# Patient Record
Sex: FEMALE | Race: WHITE | NOT HISPANIC OR LATINO | Employment: FULL TIME | URBAN - METROPOLITAN AREA
[De-identification: names, ages, dates, MRNs, and addresses within clinical notes are randomized per-mention and may not be internally consistent; named-entity substitution may affect disease eponyms.]

---

## 2017-12-09 ENCOUNTER — OFFICE VISIT (OUTPATIENT)
Dept: URGENT CARE | Facility: CLINIC | Age: 62
End: 2017-12-09
Payer: COMMERCIAL

## 2017-12-09 VITALS
HEIGHT: 62 IN | HEART RATE: 68 BPM | TEMPERATURE: 98 F | BODY MASS INDEX: 23.92 KG/M2 | SYSTOLIC BLOOD PRESSURE: 122 MMHG | OXYGEN SATURATION: 98 % | DIASTOLIC BLOOD PRESSURE: 74 MMHG | WEIGHT: 130 LBS | RESPIRATION RATE: 14 BRPM

## 2017-12-09 DIAGNOSIS — G43.009 MIGRAINE WITHOUT AURA AND WITHOUT STATUS MIGRAINOSUS, NOT INTRACTABLE: Primary | ICD-10-CM

## 2017-12-09 PROCEDURE — 99213 OFFICE O/P EST LOW 20 MIN: CPT | Mod: 25,S$GLB,, | Performed by: INTERNAL MEDICINE

## 2017-12-09 PROCEDURE — S0119 ONDANSETRON 4 MG: HCPCS | Mod: S$GLB,,, | Performed by: INTERNAL MEDICINE

## 2017-12-09 PROCEDURE — 96372 THER/PROPH/DIAG INJ SC/IM: CPT | Mod: S$GLB,,, | Performed by: INTERNAL MEDICINE

## 2017-12-09 RX ORDER — ONDANSETRON 8 MG/1
8 TABLET, ORALLY DISINTEGRATING ORAL
Status: COMPLETED | OUTPATIENT
Start: 2017-12-09 | End: 2017-12-09

## 2017-12-09 RX ORDER — KETOROLAC TROMETHAMINE 30 MG/ML
60 INJECTION, SOLUTION INTRAMUSCULAR; INTRAVENOUS
Status: COMPLETED | OUTPATIENT
Start: 2017-12-09 | End: 2017-12-09

## 2017-12-09 RX ORDER — ONDANSETRON 8 MG/1
8 TABLET, ORALLY DISINTEGRATING ORAL EVERY 6 HOURS PRN
Qty: 10 TABLET | Refills: 1 | Status: SHIPPED | OUTPATIENT
Start: 2017-12-09 | End: 2020-01-22 | Stop reason: ALTCHOICE

## 2017-12-09 RX ORDER — SUMATRIPTAN 50 MG/1
50 TABLET, FILM COATED ORAL ONCE AS NEEDED
Qty: 30 TABLET | Refills: 2 | Status: SHIPPED | OUTPATIENT
Start: 2017-12-09 | End: 2018-01-04

## 2017-12-09 RX ADMIN — ONDANSETRON 8 MG: 8 TABLET, ORALLY DISINTEGRATING ORAL at 11:12

## 2017-12-09 RX ADMIN — KETOROLAC TROMETHAMINE 60 MG: 30 INJECTION, SOLUTION INTRAMUSCULAR; INTRAVENOUS at 11:12

## 2017-12-09 NOTE — PROGRESS NOTES
"Subjective:       Patient ID: Kem Rousseau is a 61 y.o. female.    Vitals:  height is 5' 2" (1.575 m) and weight is 59 kg (130 lb). Her tympanic temperature is 97.8 °F (36.6 °C). Her blood pressure is 122/74 and her pulse is 68. Her respiration is 14 and oxygen saturation is 98%.     Chief Complaint: Headache    Headache    This is a recurrent problem. The current episode started yesterday. The problem occurs constantly. The problem has been gradually worsening. The pain is located in the left unilateral, frontal and temporal region. The pain radiates to the face. The pain quality is similar to prior headaches. The quality of the pain is described as throbbing. The pain is at a severity of 10/10. The pain is severe. Associated symptoms include ear pain, a loss of balance and nausea. Pertinent negatives include no blurred vision, dizziness, fever, neck pain, numbness, photophobia, seizures, tinnitus, vomiting or weakness. The symptoms are aggravated by bright light. She has tried acetaminophen and darkened room for the symptoms. The treatment provided no relief. Her past medical history is significant for migraine headaches.     Review of Systems   Constitution: Negative for chills, fever and weakness.   HENT: Positive for ear pain. Negative for congestion and tinnitus.    Eyes: Negative for blurred vision and photophobia.   Skin: Negative for rash.   Musculoskeletal: Negative for neck pain.   Gastrointestinal: Positive for nausea. Negative for vomiting.   Neurological: Positive for headaches and loss of balance. Negative for disturbances in coordination, dizziness, numbness and seizures.   Psychiatric/Behavioral: Negative for altered mental status. The patient is not nervous/anxious.        Objective:      Physical Exam   Constitutional: She is oriented to person, place, and time. She appears well-developed and well-nourished.   Eyes: Conjunctivae are normal. Pupils are equal, round, and reactive to light. "   Photophobia   Neck: Normal range of motion. Neck supple.   Cardiovascular: Normal rate, regular rhythm and normal heart sounds.    Pulmonary/Chest: Effort normal and breath sounds normal.   Abdominal: Soft.   Lymphadenopathy:     She has no cervical adenopathy.   Neurological: She is alert and oriented to person, place, and time. She displays normal reflexes. No cranial nerve deficit or sensory deficit. She exhibits normal muscle tone.   Skin: Skin is warm. No rash noted.   Psychiatric: She has a normal mood and affect.   Nursing note and vitals reviewed.      Assessment:       1. Migraine without aura and without status migrainosus, not intractable        Plan:         Migraine without aura and without status migrainosus, not intractable  -     ketorolac injection 60 mg; Inject 2 mLs (60 mg total) into the muscle one time.  -     ondansetron disintegrating tablet 8 mg; Take 1 tablet (8 mg total) by mouth one time.  -     sumatriptan (IMITREX) 50 MG tablet; Take 1 tablet (50 mg total) by mouth once as needed for Migraine.  Dispense: 30 tablet; Refill: 2  -     ondansetron (ZOFRAN-ODT) 8 MG TbDL; Take 1 tablet (8 mg total) by mouth every 6 (six) hours as needed (Nausea).  Dispense: 10 tablet; Refill: 1

## 2017-12-09 NOTE — PATIENT INSTRUCTIONS

## 2017-12-12 ENCOUNTER — TELEPHONE (OUTPATIENT)
Dept: URGENT CARE | Facility: CLINIC | Age: 62
End: 2017-12-12

## 2018-01-04 PROBLEM — G43.909 MIGRAINE: Status: ACTIVE | Noted: 2018-01-04

## 2018-01-04 PROBLEM — F41.9 ANXIETY DISORDER: Status: ACTIVE | Noted: 2018-01-04

## 2018-01-05 ENCOUNTER — TELEPHONE (OUTPATIENT)
Dept: NEUROLOGY | Facility: CLINIC | Age: 63
End: 2018-01-05

## 2018-04-07 PROBLEM — G47.33 OSA (OBSTRUCTIVE SLEEP APNEA): Status: ACTIVE | Noted: 2018-04-07

## 2018-06-11 ENCOUNTER — OFFICE VISIT (OUTPATIENT)
Dept: URGENT CARE | Facility: CLINIC | Age: 63
End: 2018-06-11
Payer: COMMERCIAL

## 2018-06-11 VITALS
TEMPERATURE: 98 F | WEIGHT: 133 LBS | HEART RATE: 62 BPM | SYSTOLIC BLOOD PRESSURE: 120 MMHG | DIASTOLIC BLOOD PRESSURE: 72 MMHG | HEIGHT: 62 IN | OXYGEN SATURATION: 98 % | BODY MASS INDEX: 24.48 KG/M2
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
## 2018-06-11 PROCEDURE — 99214 OFFICE O/P EST MOD 30 MIN: CPT | Mod: S$GLB,,, | Performed by: EMERGENCY MEDICINE

## 2018-06-11 PROCEDURE — 3008F BODY MASS INDEX DOCD: CPT | Mod: CPTII,S$GLB,, | Performed by: EMERGENCY MEDICINE

## 2018-06-11 RX ORDER — MUPIROCIN 20 MG/G
OINTMENT TOPICAL
Qty: 22 G | Refills: 1 | Status: SHIPPED | OUTPATIENT
Start: 2018-06-11 | End: 2018-09-26

## 2018-06-11 RX ORDER — SULFAMETHOXAZOLE AND TRIMETHOPRIM 400; 80 MG/1; MG/1
1 TABLET ORAL 2 TIMES DAILY
Qty: 20 TABLET | Refills: 0 | Status: SHIPPED | OUTPATIENT
Start: 2018-06-11 | End: 2018-06-21

## 2018-06-11 NOTE — PROGRESS NOTES
"Subjective:       Patient ID: Kem Rousseau is a 62 y.o. female.    Vitals:  height is 5' 2" (1.575 m) and weight is 60.3 kg (133 lb). Her oral temperature is 97.7 °F (36.5 °C). Her blood pressure is 120/72 and her pulse is 62. Her oxygen saturation is 98%.     Chief Complaint: Insect Bite (possible spider, left forearm)    Insect Bite   This is a new problem. The current episode started in the past 7 days. The problem occurs daily. Pertinent negatives include no chills, fever, rash (stinging) or sore throat. The treatment provided no relief.     Review of Systems   Constitution: Negative for chills and fever.   HENT: Negative for sore throat.    Respiratory: Negative for shortness of breath.    Skin: Positive for color change (left forearm) and itching. Negative for rash (stinging).   Musculoskeletal: Negative for joint pain.       Objective:      Physical Exam   Constitutional: She is oriented to person, place, and time. She appears well-developed and well-nourished.   HENT:   Head: Normocephalic and atraumatic. Head is without abrasion, without contusion and without laceration.   Right Ear: External ear normal.   Left Ear: External ear normal.   Nose: Nose normal.   Mouth/Throat: Oropharynx is clear and moist.   Eyes: Conjunctivae, EOM and lids are normal. Pupils are equal, round, and reactive to light.   Neck: Trachea normal, full passive range of motion without pain and phonation normal. Neck supple.   Cardiovascular: Normal rate, regular rhythm and normal heart sounds.    Pulmonary/Chest: Effort normal and breath sounds normal. No stridor. No respiratory distress.   Musculoskeletal: Normal range of motion.   Neurological: She is alert and oriented to person, place, and time.   Skin: Skin is warm, dry and intact. Capillary refill takes less than 2 seconds. No abrasion, no bruising, no burn, no ecchymosis, no laceration, no lesion and no rash noted. No erythema.        Psychiatric: She has a normal mood and " affect. Her speech is normal and behavior is normal. Judgment and thought content normal. Cognition and memory are normal.   Nursing note and vitals reviewed.      Assessment:       1. Bug bite with infection, initial encounter        Plan:         Bug bite with infection, initial encounter  -     mupirocin (BACTROBAN) 2 % ointment; Apply to affected area 3 times daily  Dispense: 22 g; Refill: 1  -     sulfamethoxazole-trimethoprim 400-80mg (BACTRIM) 400-80 mg per tablet; Take 1 tablet by mouth 2 (two) times daily.  Dispense: 20 tablet; Refill: 0

## 2018-06-11 NOTE — PATIENT INSTRUCTIONS
"  Insect, Spider, and Scorpion Bites and Stings  Most insect bites are harmless and cause only minor swelling or itching. But if youre allergic to insects such as wasps or bees, a sting can cause a life-threatening allergic reaction. Some ticks can carry and transmit serious diseases. The venom (poison) from scorpions and certain spiders can also be deadly, although this is rare. Knowing when to seek emergency care could save your life.     The black  (top) and brown recluse (bottom) are two poisonous spiders found in the [place].   When to go to the emergency room (ER)  · Scorpion sting  · Bite from a black, red, or brown  spider or brown recluse spider  · Severe pain or swelling at the site of bite  · A tick that is embedded in your skin and can not be easily removed at home  · Signs of an allergic reaction such as:  ¨ Hives  ¨ Swelling of your eyes, lips, or the inside of your throat  ¨ Trouble breathing  ¨ Dizziness or confusion  What to expect in the ER  · If youre having trouble breathing, youll be given oxygen through a mask. In case of severe breathing difficulty, you may have a tube inserted in your throat and be placed on a ventilator (breathing machine).  · If you are having a severe allergic reaction from a sting (called anaphylaxis), you may be given a shot of epinephrine. If it is known that you are allergic to bee or wasp stings, your doctor may give you a prescription for an "epi-pen" that you can keep with you at all times in case of a sting.  · You may receive antivenin (a substance that reverses the effects of poison) for some spider bites and scorpion stings. Because antivenin can sometimes cause other problems, your doctor will weigh the risks and benefits of this treatment.  · Steroids such as prednisone are often used to treat allergic reactions. In many cases, your doctor will also prescribe an antihistamine to help relieve itching.  Easing symptoms of an insect bite or " sting  · Try to remove a stinger you can see. Use your fingernail, a knife edge, or credit card to scrape against the skin. Do not squeeze or pull.  · Apply ice or a cold compress to reduce pain and swelling (keep a thin cloth between the cold source and the skin).   Date Last Reviewed: 12/1/2016  © 7230-3730 [employer]. [address] [place] [address], [place] [address]. All rights reserved. This information is not intended as a substitute for professional medical care. Always follow your healthcare professional's instructions.

## 2018-06-14 ENCOUNTER — TELEPHONE (OUTPATIENT)
Dept: URGENT CARE | Facility: CLINIC | Age: 63
End: 2018-06-14

## 2018-09-26 ENCOUNTER — OFFICE VISIT (OUTPATIENT)
Dept: OBSTETRICS AND GYNECOLOGY | Facility: CLINIC | Age: 63
End: 2018-09-26
Payer: COMMERCIAL

## 2018-09-26 VITALS
SYSTOLIC BLOOD PRESSURE: 144 MMHG | BODY MASS INDEX: 24.69 KG/M2 | DIASTOLIC BLOOD PRESSURE: 78 MMHG | HEIGHT: 62 IN | WEIGHT: 134.19 LBS

## 2018-09-26 DIAGNOSIS — Z12.39 SCREENING BREAST EXAMINATION: ICD-10-CM

## 2018-09-26 DIAGNOSIS — M85.80 OSTEOPENIA, UNSPECIFIED LOCATION: ICD-10-CM

## 2018-09-26 DIAGNOSIS — Z12.4 ROUTINE CERVICAL SMEAR: Primary | ICD-10-CM

## 2018-09-26 PROCEDURE — 99386 PREV VISIT NEW AGE 40-64: CPT | Mod: S$GLB,,, | Performed by: OBSTETRICS & GYNECOLOGY

## 2018-09-26 PROCEDURE — 99999 PR PBB SHADOW E&M-EST. PATIENT-LVL IV: CPT | Mod: PBBFAC,,, | Performed by: OBSTETRICS & GYNECOLOGY

## 2018-09-26 PROCEDURE — 88175 CYTOPATH C/V AUTO FLUID REDO: CPT

## 2018-09-26 RX ORDER — SUMATRIPTAN 50 MG/1
TABLET, FILM COATED ORAL
COMMUNITY
Start: 2018-08-13 | End: 2019-07-22 | Stop reason: SDUPTHER

## 2018-09-26 RX ORDER — PANTOPRAZOLE SODIUM 40 MG/1
40 TABLET, DELAYED RELEASE ORAL
COMMUNITY
End: 2018-11-16 | Stop reason: SDUPTHER

## 2018-09-26 NOTE — PROGRESS NOTES
"HPI:   62 y.o.   OB History      Para Term  AB Living    5 4 4   1      SAB TAB Ectopic Multiple Live Births                    No LMP recorded. Patient is postmenopausal.    Patient is  here for her annual gynecologic exam.  She has no complaints.     ROS:  GENERAL: No fever, chills, fatigability or weight loss.  SKIN: No rashes, itching or changes in color or texture of skin.  HEAD: No headaches or recent head trauma.  EYES: Visual acuity fine. No photophobia, ocular pain or diplopia.  EARS: Denies ear pain, discharge or vertigo.  NOSE: No loss of smell, no epistaxis or postnasal drip.  MOUTH & THROAT: No hoarseness or change in voice. No excessive gum bleeding.  NODES: Denies swollen glands.  CHEST: Denies MOROCHO, cyanosis, wheezing, cough and sputum production.  CARDIOVASCULAR: Denies chest pain, PND, orthopnea or reduced exercise tolerance.  ABDOMEN: Appetite fine. No weight loss. Denies diarrhea, abdominal pain, hematemesis or blood in stool.  URINARY: No flank pain, dysuria or hematuria.  PERIPHERAL VASCULAR: No claudication or cyanosis.  MUSCULOSKELETAL: No joint stiffness or swelling. Denies back pain.  NEUROLOGIC: No history of seizures, paralysis, alteration of gait or coordination.    PE:   BP (!) 144/78   Ht 5' 2" (1.575 m)   Wt 60.9 kg (134 lb 3.2 oz)   BMI 24.55 kg/m²   APPEARANCE: Well nourished, well developed, in no acute distress.  NECK: Neck symmetric without masses or thyromegaly.  BREASTS: Symmetrical, no skin changes or visible lesions. No palpable masses, nipple discharge or adenopathy bilaterally.  ABDOMEN: Flat. Soft. No tenderness or masses. No hepatosplenomegaly. No hernias. No CVA tenderness.  VULVA: No lesions. Normal female genitalia.  URETHRAL MEATUS: Normal size and location, no lesions, no prolapse.  URETHRA: No masses, tenderness, prolapse or scarring.  VAGINA: Moist and well rugated, no discharge, no significant cystocele or rectocele.  CERVIX: No lesions and " discharge. PAP done.  UTERUS: Normal size, regular shape, mobile, non-tender, bladder base nontender.  ADNEXA: No masses, tenderness or CDS nodularity.  ANUS PERINEUM: Normal.    PROCEDURES:  Pap smear    Assessment:  Normal Gynecologic Exam    Plan:  Mammogram and Colonoscopy if indicated by current recommendations.  Return to clinic in one year or for any problems or complaints.  No gyn co  On prolea for penia  Plan, mammo and bone density, may not need prolea for now?

## 2018-11-02 ENCOUNTER — HOSPITAL ENCOUNTER (OUTPATIENT)
Dept: RADIOLOGY | Facility: HOSPITAL | Age: 63
Discharge: HOME | End: 2018-11-02
Attending: OBSTETRICS & GYNECOLOGY
Payer: COMMERCIAL

## 2018-11-02 VITALS — BODY MASS INDEX: 24.7 KG/M2 | WEIGHT: 134.25 LBS | HEIGHT: 62 IN

## 2018-11-02 DIAGNOSIS — Z12.39 SCREENING BREAST EXAMINATION: ICD-10-CM

## 2018-11-02 PROCEDURE — 77080 DXA BONE DENSITY AXIAL: CPT | Mod: 26,,, | Performed by: RADIOLOGY

## 2018-11-02 PROCEDURE — 77067 SCR MAMMO BI INCL CAD: CPT | Mod: 26,,, | Performed by: RADIOLOGY

## 2018-11-02 PROCEDURE — 77063 BREAST TOMOSYNTHESIS BI: CPT | Mod: TC,PO

## 2018-11-02 PROCEDURE — 77080 DXA BONE DENSITY AXIAL: CPT | Mod: TC,PO

## 2018-11-02 PROCEDURE — 77063 BREAST TOMOSYNTHESIS BI: CPT | Mod: 26,,, | Performed by: RADIOLOGY

## 2018-11-05 ENCOUNTER — TELEPHONE (OUTPATIENT)
Dept: OBSTETRICS AND GYNECOLOGY | Facility: CLINIC | Age: 63
End: 2018-11-05

## 2018-11-05 NOTE — TELEPHONE ENCOUNTER
----- Message from Michael A. Wiedemann, MD sent at 11/5/2018  6:53 AM CST -----  Yay, her bone density was fine, actually its a little better than her last one couple years ago, yay,

## 2018-11-12 ENCOUNTER — TELEPHONE (OUTPATIENT)
Dept: OBSTETRICS AND GYNECOLOGY | Facility: CLINIC | Age: 63
End: 2018-11-12

## 2018-11-12 NOTE — TELEPHONE ENCOUNTER
----- Message from Ana Cristina William sent at 11/12/2018  1:33 PM CST -----  Contact: [phone]  Patient is returning your call. Please advise.

## 2018-11-16 ENCOUNTER — PATIENT MESSAGE (OUTPATIENT)
Dept: OBSTETRICS AND GYNECOLOGY | Facility: CLINIC | Age: 63
End: 2018-11-16

## 2018-11-16 NOTE — TELEPHONE ENCOUNTER
Pt is requesting Rx for prolia. However, your last note says she may not need it for now. Do you want me to order?

## 2018-11-28 ENCOUNTER — TELEPHONE (OUTPATIENT)
Dept: OBSTETRICS AND GYNECOLOGY | Facility: CLINIC | Age: 63
End: 2018-11-28

## 2018-11-28 NOTE — TELEPHONE ENCOUNTER
Spoke to pt who stated her prolia is at the pharmacy in [place]. Pt advised we will ask the pharmacy to hold the patients rx and then she can go on 12/7 to have Dr. Wiedemann administer the Rx

## 2018-11-28 NOTE — TELEPHONE ENCOUNTER
----- Message from Rylee Perez sent at 11/28/2018 11:01 AM CST -----  Contact: pt   States she's rtn nurses call and can be reached at [phone]//thanks/dbw

## 2018-12-05 ENCOUNTER — TELEPHONE (OUTPATIENT)
Dept: OBSTETRICS AND GYNECOLOGY | Facility: CLINIC | Age: 63
End: 2018-12-05

## 2018-12-05 NOTE — TELEPHONE ENCOUNTER
Left a message on self id voicemail that the pharmacy in [place] was unable to hold the prolia so we had it corrierred to us. Dr. Wiedemann will take the prolia with him to the clinic on Friday so he can administer it.

## 2018-12-12 ENCOUNTER — OFFICE VISIT (OUTPATIENT)
Dept: OBSTETRICS AND GYNECOLOGY | Facility: CLINIC | Age: 63
End: 2018-12-12
Payer: COMMERCIAL

## 2018-12-12 VITALS — WEIGHT: 134.5 LBS | BODY MASS INDEX: 24.75 KG/M2 | HEIGHT: 62 IN

## 2018-12-12 DIAGNOSIS — Z98.890 POST-OPERATIVE STATE: Primary | ICD-10-CM

## 2018-12-12 PROCEDURE — 99499 UNLISTED E&M SERVICE: CPT | Mod: S$GLB,,, | Performed by: OBSTETRICS & GYNECOLOGY

## 2018-12-12 PROCEDURE — 99999 PR PBB SHADOW E&M-EST. PATIENT-LVL III: CPT | Mod: PBBFAC,,, | Performed by: OBSTETRICS & GYNECOLOGY

## 2019-07-22 PROBLEM — G47.33 OSA (OBSTRUCTIVE SLEEP APNEA): Status: RESOLVED | Noted: 2018-04-07 | Resolved: 2019-07-22

## 2019-08-16 DIAGNOSIS — M25.571 ACUTE RIGHT ANKLE PAIN: Primary | ICD-10-CM

## 2019-08-19 ENCOUNTER — OFFICE VISIT (OUTPATIENT)
Dept: ORTHOPEDICS | Facility: CLINIC | Age: 64
End: 2019-08-19
Payer: COMMERCIAL

## 2019-08-19 ENCOUNTER — HOSPITAL ENCOUNTER (OUTPATIENT)
Dept: RADIOLOGY | Facility: HOSPITAL | Age: 64
Discharge: HOME | End: 2019-08-19
Attending: ORTHOPAEDIC SURGERY
Payer: COMMERCIAL

## 2019-08-19 VITALS
DIASTOLIC BLOOD PRESSURE: 82 MMHG | HEIGHT: 62 IN | SYSTOLIC BLOOD PRESSURE: 129 MMHG | BODY MASS INDEX: 24.67 KG/M2 | HEART RATE: 68 BPM | WEIGHT: 134.06 LBS

## 2019-08-19 DIAGNOSIS — R22.41 FOOT MASS, RIGHT: Primary | ICD-10-CM

## 2019-08-19 DIAGNOSIS — M25.571 ACUTE RIGHT ANKLE PAIN: ICD-10-CM

## 2019-08-19 DIAGNOSIS — R22.41 FOOT MASS, RIGHT: ICD-10-CM

## 2019-08-19 DIAGNOSIS — M79.671 ACUTE FOOT PAIN, RIGHT: ICD-10-CM

## 2019-08-19 PROCEDURE — 99243 OFF/OP CNSLTJ NEW/EST LOW 30: CPT | Mod: S$GLB,,, | Performed by: ORTHOPAEDIC SURGERY

## 2019-08-19 PROCEDURE — 99243 PR OFFICE CONSULTATION,LEVEL III: ICD-10-PCS | Mod: S$GLB,,, | Performed by: ORTHOPAEDIC SURGERY

## 2019-08-19 PROCEDURE — 73630 XR FOOT COMPLETE 3 VIEW RIGHT: ICD-10-PCS | Mod: 26,RT,, | Performed by: RADIOLOGY

## 2019-08-19 PROCEDURE — 73630 X-RAY EXAM OF FOOT: CPT | Mod: TC,PO,RT

## 2019-08-19 PROCEDURE — 73630 X-RAY EXAM OF FOOT: CPT | Mod: 26,RT,, | Performed by: RADIOLOGY

## 2019-08-19 PROCEDURE — 99999 PR PBB SHADOW E&M-EST. PATIENT-LVL III: ICD-10-PCS | Mod: PBBFAC,,, | Performed by: ORTHOPAEDIC SURGERY

## 2019-08-19 PROCEDURE — 99999 PR PBB SHADOW E&M-EST. PATIENT-LVL III: CPT | Mod: PBBFAC,,, | Performed by: ORTHOPAEDIC SURGERY

## 2019-08-19 NOTE — LETTER
August 19, 2019        Baron Parmar DPM  [address] [place] [address]             [facility]  [address] [place] [address]  Phone: [phone]   Patient: Kem Rousseau   MR Number: [id number]   YOB: 1955   Date of Visit: 8/19/2019       Dear Dr. Parmar:    Thank you for referring Kem Rousseau to me for evaluation. Below are the relevant portions of my assessment and plan of care.            If you have questions, please do not hesitate to call me. I look forward to following Kem along with you.    Sincerely,      Xavier Duque MD           CC  No Recipients

## 2019-08-19 NOTE — PROGRESS NOTES
"HPI: Kem Rousseau is a 63 y.o. female who was referred to me by Dr. Parmar and was seen in consultation today for right foot painful mass for several years. She said she had a bone spur of some type removed from her foot and grafted by Dr. Contreras Pelayo DPM in 2014. She said she has noticed the mass increasing in size over the past couple of years. It is painful with shoewear and swells all the time. She does have some numbness in the great toe dorsally since surgery.     PAST MEDICAL/SURGICAL/FAMILY/SOCIAL/ HISTORY: REVIEWED    ALLERGIES/MEDICATIONS: REVIEWED       Review of Systems:     Constitution: Negative.   HEENT: Negative.   Eyes: Negative.   Cardiovascular: Negative.   Respiratory: Negative.   Endocrine: Negative.   Hematologic/Lymphatic: Negative.   Skin: Negative.   Musculoskeletal: Positive for right foot pain   Gastrointestinal: Negative.   Genitourinary: Negative.   Neurological: Negative.   Psychiatric/Behavioral: Negative.   Allergic/Immunologic: Negative.       PHYSICAL EXAM:  Vitals:    08/19/19 1555   BP: 129/82   Pulse: 68     Ht Readings from Last 1 Encounters:   08/19/19 5' 2" (1.575 m)     Wt Readings from Last 1 Encounters:   08/19/19 60.8 kg (134 lb 0.6 oz)         GENERAL: Well developed, well nourished, no acute distress.  Very pleasant.   SKIN: Skin is intact. No atrophy, abrasions or lesions are noted.   Neurological: Normal mental status. Appropriate and conversant. Alert and oriented x 3.  GAIT: Walks with non-antalgic gait.    Right lower extremity compared with LLE:  2+ dorsalis pedis pulse.  Capillary refill < 3 seconds.  Normal range of motion tibiotalar and subtalar joints. Normal alignment of the forefoot and the hindfoot.  5/5 strength EHL, FHL, tibialis anterior, gastrocsoleus, tibialis posterior and peroneals. Sensation to light touch intact sural, saphenous, superficial peroneal and deep peroneal nerves. + swelling of the forefoot in the area of previous surgery on the " dorsum of the 1st metatarsal.  Normal range of motion of the 1st mtpj without pain. She has a large mass that is firm and mobile in the area.     XRAYS:   3 views of right foot obtained and reviewed today reveal No evidence of fractures or dislocations. Mild changes in the mid shaft of the 1st metatarsal c/w previous surgery.       ASSESSMENT:        Encounter Diagnosis   Name Primary?    Foot mass, right         PLAN:   I spent 20 minutes in consulation with the patient today. More than half the time was spent counseling the patient on her condition and the options for operative versus non-operative care.  I recommend MRI of the right forefoot with and without contrast to evaluate the mass. She will f/u after her MRI to discuss treatment options.

## 2019-08-23 ENCOUNTER — TELEPHONE (OUTPATIENT)
Dept: OBSTETRICS AND GYNECOLOGY | Facility: CLINIC | Age: 64
End: 2019-08-23

## 2019-08-23 NOTE — TELEPHONE ENCOUNTER
----- Message from Brenda Perez sent at 8/23/2019  2:27 PM CDT -----  Contact: Patient/ [phone]  Patient is requesting a callback in regards to the medication PROLIA 60 mg/mL Syrg.      Please call.

## 2019-08-26 NOTE — TELEPHONE ENCOUNTER
I think the pharmacy in [facility] [place] [place] can give shots, right??  Like here?  Ask maile

## 2019-08-29 ENCOUNTER — TELEPHONE (OUTPATIENT)
Dept: OBSTETRICS AND GYNECOLOGY | Facility: CLINIC | Age: 64
End: 2019-08-29

## 2019-09-10 DIAGNOSIS — R22.41 FOOT MASS, RIGHT: Primary | ICD-10-CM

## 2019-09-16 ENCOUNTER — TELEPHONE (OUTPATIENT)
Dept: OBSTETRICS AND GYNECOLOGY | Facility: CLINIC | Age: 64
End: 2019-09-16

## 2019-10-04 ENCOUNTER — TELEPHONE (OUTPATIENT)
Dept: OBSTETRICS AND GYNECOLOGY | Facility: CLINIC | Age: 64
End: 2019-10-04

## 2019-10-04 NOTE — TELEPHONE ENCOUNTER
Pt called to check on her prolia. Pt notified it was ordered today and we will call her once we receive

## 2019-10-04 NOTE — TELEPHONE ENCOUNTER
----- Message from Arabella Kumar sent at 10/4/2019  9:27 AM CDT -----  Contact: [phone] self   Pt is requesting to speak with you re: prescription status. Please advise

## 2019-10-08 ENCOUNTER — TELEPHONE (OUTPATIENT)
Dept: OBSTETRICS AND GYNECOLOGY | Facility: CLINIC | Age: 64
End: 2019-10-08

## 2019-10-08 NOTE — TELEPHONE ENCOUNTER
Left message for pt on self id voicemail that we received her prolia and she can call me back to set up and injection

## 2019-10-08 NOTE — TELEPHONE ENCOUNTER
----- Message from Torie Hampton sent at 10/8/2019  3:16 PM CDT -----  Contact: self / [phone]  Patient is returning a call from your office. Please advise

## 2019-10-08 NOTE — TELEPHONE ENCOUNTER
----- Message from Isai Mclean sent at 10/8/2019  2:46 PM CDT -----  Contact: pt   Pt would like a call back regarding her prolia shot insisted on message being sent     Pt can be reached at [phone]

## 2019-10-16 ENCOUNTER — OFFICE VISIT (OUTPATIENT)
Dept: OBSTETRICS AND GYNECOLOGY | Facility: CLINIC | Age: 64
End: 2019-10-16
Payer: COMMERCIAL

## 2019-10-16 VITALS — WEIGHT: 137.13 LBS | HEIGHT: 62 IN | BODY MASS INDEX: 25.23 KG/M2

## 2019-10-16 DIAGNOSIS — Z98.890 POST-OPERATIVE STATE: Primary | ICD-10-CM

## 2019-10-16 PROCEDURE — 99499 UNLISTED E&M SERVICE: CPT | Mod: S$GLB,,, | Performed by: OBSTETRICS & GYNECOLOGY

## 2019-10-16 PROCEDURE — 99499 NO LOS: ICD-10-PCS | Mod: S$GLB,,, | Performed by: OBSTETRICS & GYNECOLOGY

## 2019-10-16 PROCEDURE — 99999 PR PBB SHADOW E&M-EST. PATIENT-LVL II: CPT | Mod: PBBFAC,,, | Performed by: OBSTETRICS & GYNECOLOGY

## 2019-10-16 PROCEDURE — 99999 PR PBB SHADOW E&M-EST. PATIENT-LVL II: ICD-10-PCS | Mod: PBBFAC,,, | Performed by: OBSTETRICS & GYNECOLOGY

## 2020-05-18 ENCOUNTER — PATIENT MESSAGE (OUTPATIENT)
Dept: OBSTETRICS AND GYNECOLOGY | Facility: CLINIC | Age: 65
End: 2020-05-18

## 2020-06-08 ENCOUNTER — PATIENT MESSAGE (OUTPATIENT)
Dept: OBSTETRICS AND GYNECOLOGY | Facility: CLINIC | Age: 65
End: 2020-06-08

## 2020-06-09 DIAGNOSIS — M79.671 RIGHT FOOT PAIN: Primary | ICD-10-CM

## 2020-06-15 ENCOUNTER — OFFICE VISIT (OUTPATIENT)
Dept: ORTHOPEDICS | Facility: CLINIC | Age: 65
End: 2020-06-15
Payer: MEDICARE

## 2020-06-15 ENCOUNTER — HOSPITAL ENCOUNTER (OUTPATIENT)
Dept: RADIOLOGY | Facility: HOSPITAL | Age: 65
Discharge: HOME | End: 2020-06-15
Attending: ORTHOPAEDIC SURGERY
Payer: MEDICARE

## 2020-06-15 VITALS
HEART RATE: 65 BPM | SYSTOLIC BLOOD PRESSURE: 120 MMHG | WEIGHT: 134.94 LBS | HEIGHT: 61 IN | DIASTOLIC BLOOD PRESSURE: 77 MMHG | BODY MASS INDEX: 25.48 KG/M2

## 2020-06-15 DIAGNOSIS — M79.671 RIGHT FOOT PAIN: ICD-10-CM

## 2020-06-15 DIAGNOSIS — R22.41 FOOT MASS, RIGHT: Primary | ICD-10-CM

## 2020-06-15 PROCEDURE — 73630 X-RAY EXAM OF FOOT: CPT | Mod: TC,PO,RT

## 2020-06-15 PROCEDURE — 99213 OFFICE O/P EST LOW 20 MIN: CPT | Mod: PBBFAC,25,PN | Performed by: ORTHOPAEDIC SURGERY

## 2020-06-15 PROCEDURE — 99214 PR OFFICE/OUTPT VISIT, EST, LEVL IV, 30-39 MIN: ICD-10-PCS | Mod: S$PBB,,, | Performed by: ORTHOPAEDIC SURGERY

## 2020-06-15 PROCEDURE — 99999 PR PBB SHADOW E&M-EST. PATIENT-LVL III: CPT | Mod: PBBFAC,,, | Performed by: ORTHOPAEDIC SURGERY

## 2020-06-15 PROCEDURE — 73630 XR FOOT COMPLETE 3 VIEW RIGHT: ICD-10-PCS | Mod: 26,RT,, | Performed by: RADIOLOGY

## 2020-06-15 PROCEDURE — 99214 OFFICE O/P EST MOD 30 MIN: CPT | Mod: S$PBB,,, | Performed by: ORTHOPAEDIC SURGERY

## 2020-06-15 PROCEDURE — 73630 X-RAY EXAM OF FOOT: CPT | Mod: 26,RT,, | Performed by: RADIOLOGY

## 2020-06-15 PROCEDURE — 99999 PR PBB SHADOW E&M-EST. PATIENT-LVL III: ICD-10-PCS | Mod: PBBFAC,,, | Performed by: ORTHOPAEDIC SURGERY

## 2020-06-15 NOTE — PROGRESS NOTES
"HPI: Kem Rousseau is a 64 y.o. female who was referred to me by Dr. Parmar and was seen for f/u  today for right foot painful mass for several years. She said she had a bone spur of some type removed from her foot and grafted by Dr. Contreras Pelayo DPM in 2014. She said she has noticed the mass increasing in size over the past couple of years. It is painful with shoewear and swells all the time. She does have some numbness in the great toe dorsally since surgery.     PAST MEDICAL/SURGICAL/FAMILY/SOCIAL/ HISTORY: REVIEWED    ALLERGIES/MEDICATIONS: REVIEWED       Review of Systems:     Constitution: Negative.   HEENT: Negative.   Eyes: Negative.   Cardiovascular: Negative.   Respiratory: Negative.   Endocrine: Negative.   Hematologic/Lymphatic: Negative.   Skin: Negative.   Musculoskeletal: Positive for right foot pain   Gastrointestinal: Negative.   Genitourinary: Negative.   Neurological: Negative.   Psychiatric/Behavioral: Negative.   Allergic/Immunologic: Negative.       PHYSICAL EXAM:  Vitals:    06/15/20 1610   BP: 120/77   Pulse: 65     Ht Readings from Last 1 Encounters:   06/15/20 5' 1" (1.549 m)     Wt Readings from Last 1 Encounters:   06/15/20 61.2 kg (134 lb 14.7 oz)         GENERAL: Well developed, well nourished, no acute distress.  Very pleasant.   SKIN: Skin is intact. No atrophy, abrasions or lesions are noted.   Neurological: Normal mental status. Appropriate and conversant. Alert and oriented x 3.  GAIT: Walks with non-antalgic gait.    Right lower extremity compared with LLE:  2+ dorsalis pedis pulse.  Capillary refill < 3 seconds.  Normal range of motion tibiotalar and subtalar joints. Normal alignment of the forefoot and the hindfoot.  5/5 strength EHL, FHL, tibialis anterior, gastrocsoleus, tibialis posterior and peroneals. Sensation to light touch intact sural, saphenous, superficial peroneal and deep peroneal nerves. + swelling of the forefoot in the area of previous surgery on the dorsum " of the 1st metatarsal.  Normal range of motion of the 1st mtpj without pain. She has a large mass that is firm and somewhat mobile in the area.     XRAYS:   3 views of right foot obtained and reviewed today reveal No evidence of fractures or dislocations. Mild changes in the mid shaft of the 1st metatarsal c/w previous surgery.       ASSESSMENT: Right foot mass      PLAN:   I spent 15 minutes in consulation with the patient today. More than half the time was spent counseling the patient on her condition and the options for operative versus non-operative care.  I recommend MRI of the right forefoot with and without contrast to evaluate the mass. She was unable to have the MRI done after her last visit due to insurance issues and her  was having chemo.  She will f/u after her MRI to discuss treatment options.

## 2020-06-26 ENCOUNTER — OFFICE VISIT (OUTPATIENT)
Dept: URGENT CARE | Facility: CLINIC | Age: 65
End: 2020-06-26
Payer: MEDICARE

## 2020-06-26 VITALS
DIASTOLIC BLOOD PRESSURE: 77 MMHG | OXYGEN SATURATION: 97 % | TEMPERATURE: 98 F | HEART RATE: 59 BPM | SYSTOLIC BLOOD PRESSURE: 151 MMHG

## 2020-06-26 DIAGNOSIS — Z20.822 EXPOSURE TO COVID-19 VIRUS: ICD-10-CM

## 2020-06-26 PROCEDURE — U0003 INFECTIOUS AGENT DETECTION BY NUCLEIC ACID (DNA OR RNA); SEVERE ACUTE RESPIRATORY SYNDROME CORONAVIRUS 2 (SARS-COV-2) (CORONAVIRUS DISEASE [COVID-19]), AMPLIFIED PROBE TECHNIQUE, MAKING USE OF HIGH THROUGHPUT TECHNOLOGIES AS DESCRIBED BY CMS-2020-01-R: HCPCS

## 2020-06-26 PROCEDURE — 99211 PR OFFICE/OUTPT VISIT, EST, LEVL I: ICD-10-PCS | Mod: S$GLB,,, | Performed by: PHYSICIAN ASSISTANT

## 2020-06-26 PROCEDURE — 99211 OFF/OP EST MAY X REQ PHY/QHP: CPT | Mod: S$GLB,,, | Performed by: PHYSICIAN ASSISTANT

## 2020-06-26 NOTE — PROGRESS NOTES
Patient is here today for COVID-19 testing due to known positive exposure. Patient is asymptomatic at this time.    Counseled patient and answered questions regarding COVID-19 testing process/procedure and/or diagnosis.  We will call with results.  Patient aware and verbalized understanding.

## 2020-07-01 LAB — SARS-COV-2 RNA RESP QL NAA+PROBE: NOT DETECTED

## 2020-07-02 ENCOUNTER — OFFICE VISIT (OUTPATIENT)
Dept: ORTHOPEDICS | Facility: CLINIC | Age: 65
End: 2020-07-02
Payer: MEDICARE

## 2020-07-02 ENCOUNTER — DOCUMENTATION ONLY (OUTPATIENT)
Dept: ORTHOPEDICS | Facility: CLINIC | Age: 65
End: 2020-07-02

## 2020-07-02 VITALS
HEIGHT: 61 IN | SYSTOLIC BLOOD PRESSURE: 132 MMHG | DIASTOLIC BLOOD PRESSURE: 79 MMHG | BODY MASS INDEX: 25.48 KG/M2 | WEIGHT: 134.94 LBS | HEART RATE: 62 BPM

## 2020-07-02 DIAGNOSIS — R22.41 FOOT MASS, RIGHT: Primary | ICD-10-CM

## 2020-07-02 PROCEDURE — 99999 PR PBB SHADOW E&M-EST. PATIENT-LVL III: CPT | Mod: PBBFAC,,, | Performed by: ORTHOPAEDIC SURGERY

## 2020-07-02 PROCEDURE — 99999 PR PBB SHADOW E&M-EST. PATIENT-LVL III: ICD-10-PCS | Mod: PBBFAC,,, | Performed by: ORTHOPAEDIC SURGERY

## 2020-07-02 PROCEDURE — 99214 PR OFFICE/OUTPT VISIT, EST, LEVL IV, 30-39 MIN: ICD-10-PCS | Mod: S$PBB,,, | Performed by: ORTHOPAEDIC SURGERY

## 2020-07-02 PROCEDURE — 99214 OFFICE O/P EST MOD 30 MIN: CPT | Mod: S$PBB,,, | Performed by: ORTHOPAEDIC SURGERY

## 2020-07-02 PROCEDURE — 99213 OFFICE O/P EST LOW 20 MIN: CPT | Mod: PBBFAC,PN | Performed by: ORTHOPAEDIC SURGERY

## 2020-07-02 NOTE — PROGRESS NOTES
Instructed patient on preop instructions. Patient to stop taking any blood thinning medications at least seven days prior to scheduled surgery. Patient to not take any NSAIDS at least 7 days prior to surgery and will resume when Dr. Duque instructs them to as it can delay bone healing. Nothing to eat/drink after midnight the night prior to surgery.   Patient instructed on postop care. Instructions included to remain non weight bearing until instructed otherwise by Dr. Duque. Post op dressing can not get wet. If it gets wet patient to call office immediately or go to [facility] ER to have it replaced. Patient to keep affected limb elevated higher than the heart at all times after surgery to decrease swelling. Further instructions given to patient on preop/postop instruction handout. No NSAID form given to patient. Handout on knee scooter given to patient as well. Patient scheduled surgery for 8/12/2020. Will call patient to schedule COVID test. Patient verbalized understanding.

## 2020-07-02 NOTE — PROGRESS NOTES
"HPI: Kem Rousseau is a 64 y.o. female who was seen for f/u  today for right foot painful mass for several years. She said she had a bone spur of some type removed from her foot and grafted by Dr. Contreras Pelayo DPM in 2014 which was the second surgery on the area. She said she has noticed the mass increasing in size over the past couple of years. It is painful with shoewear and swells all the time. She does have some numbness in the great toe dorsally since surgery. She is here to f/u on her MRI results and discuss a plan for surgery.     PAST MEDICAL/SURGICAL/FAMILY/SOCIAL/ HISTORY: REVIEWED    ALLERGIES/MEDICATIONS: REVIEWED       Review of Systems:     Constitution: Negative.   HEENT: Negative.   Eyes: Negative.   Cardiovascular: Negative.   Respiratory: Negative.   Endocrine: Negative.   Hematologic/Lymphatic: Negative.   Skin: Negative.   Musculoskeletal: Positive for right foot pain   Gastrointestinal: Negative.   Genitourinary: Negative.   Neurological: Negative.   Psychiatric/Behavioral: Negative.   Allergic/Immunologic: Negative.       PHYSICAL EXAM:  Vitals:    07/02/20 1540   BP: 132/79   Pulse: 62     Ht Readings from Last 1 Encounters:   07/02/20 5' 1" (1.549 m)     Wt Readings from Last 1 Encounters:   07/02/20 61.2 kg (134 lb 14.7 oz)         GENERAL: Well developed, well nourished, no acute distress.  Very pleasant.   SKIN: Skin is intact. No atrophy, abrasions or lesions are noted.   Neurological: Normal mental status. Appropriate and conversant. Alert and oriented x 3.  GAIT: Walks with non-antalgic gait.    Right lower extremity compared with LLE:  2+ dorsalis pedis pulse.  Capillary refill < 3 seconds.  Normal range of motion tibiotalar and subtalar joints. Normal alignment of the forefoot and the hindfoot.  5/5 strength EHL, FHL, tibialis anterior, gastrocsoleus, tibialis posterior and peroneals. Sensation to light touch intact sural, saphenous, superficial peroneal and deep peroneal nerves. " + swelling of the forefoot in the area of previous surgery on the dorsum of the 1st metatarsal.  Normal range of motion of the 1st mtpj without pain. She has a large mass that is firm and somewhat mobile in the area. She does have numbness on the lateral aspect of the great toe and at the incision laterally.     XRAYS:   3 views of right foot obtained and reviewed today reveal No evidence of fractures or dislocations. Mild changes in the mid shaft of the 1st metatarsal c/w previous surgery.       ASSESSMENT: Right foot mass    PLAN:   I spent 15 minutes in consulation with the patient today. More than half the time was spent counseling the patient on her condition and the options for operative versus non-operative care.  I recommend excision of right foot mass. We discussed possibility of recurrence as well.   I have explained the procedure, including indications, risks, and alternatives.  Kem MOSLEY Onelia gave informed consent and is medically ready for surgery. To OR 8/12/20.

## 2020-07-17 DIAGNOSIS — Z01.818 PREOP TESTING: ICD-10-CM

## 2020-07-17 RX ORDER — SODIUM CHLORIDE 9 MG/ML
INJECTION, SOLUTION INTRAVENOUS CONTINUOUS
Status: CANCELLED | OUTPATIENT
Start: 2020-07-17

## 2020-07-29 ENCOUNTER — OFFICE VISIT (OUTPATIENT)
Dept: OBSTETRICS AND GYNECOLOGY | Facility: CLINIC | Age: 65
End: 2020-07-29
Payer: MEDICARE

## 2020-07-29 VITALS
SYSTOLIC BLOOD PRESSURE: 131 MMHG | DIASTOLIC BLOOD PRESSURE: 81 MMHG | HEIGHT: 61 IN | BODY MASS INDEX: 25.89 KG/M2 | WEIGHT: 137.13 LBS

## 2020-07-29 DIAGNOSIS — Z12.4 SCREENING FOR MALIGNANT NEOPLASM OF THE CERVIX: Primary | ICD-10-CM

## 2020-07-29 DIAGNOSIS — Z01.419 WELL WOMAN EXAM WITH ROUTINE GYNECOLOGICAL EXAM: ICD-10-CM

## 2020-07-29 PROCEDURE — G0101 PR CA SCREEN;PELVIC/BREAST EXAM: ICD-10-PCS | Mod: S$PBB,,, | Performed by: OBSTETRICS & GYNECOLOGY

## 2020-07-29 PROCEDURE — G0101 CA SCREEN;PELVIC/BREAST EXAM: HCPCS | Mod: S$PBB,,, | Performed by: OBSTETRICS & GYNECOLOGY

## 2020-07-29 PROCEDURE — 88175 CYTOPATH C/V AUTO FLUID REDO: CPT

## 2020-07-29 PROCEDURE — 99213 OFFICE O/P EST LOW 20 MIN: CPT | Mod: PBBFAC,PO | Performed by: OBSTETRICS & GYNECOLOGY

## 2020-07-29 PROCEDURE — 99999 PR PBB SHADOW E&M-EST. PATIENT-LVL III: ICD-10-PCS | Mod: PBBFAC,,, | Performed by: OBSTETRICS & GYNECOLOGY

## 2020-07-29 PROCEDURE — 99999 PR PBB SHADOW E&M-EST. PATIENT-LVL III: CPT | Mod: PBBFAC,,, | Performed by: OBSTETRICS & GYNECOLOGY

## 2020-07-29 PROCEDURE — G0101 CA SCREEN;PELVIC/BREAST EXAM: HCPCS | Mod: PBBFAC,PO | Performed by: OBSTETRICS & GYNECOLOGY

## 2020-07-29 NOTE — PROGRESS NOTES
"HPI:   64 y.o.   OB History        5    Para   4    Term   4            AB   1    Living           SAB        TAB        Ectopic        Multiple        Live Births                  No LMP recorded. Patient is postmenopausal.    Patient is  here for her annual gynecologic exam.  She has no complaints.     ROS:  GENERAL: No fever, chills, fatigability or weight loss.  SKIN: No rashes, itching or changes in color or texture of skin.  HEAD: No headaches or recent head trauma.  EYES: Visual acuity fine. No photophobia, ocular pain or diplopia.  EARS: Denies ear pain, discharge or vertigo.  NOSE: No loss of smell, no epistaxis or postnasal drip.  MOUTH & THROAT: No hoarseness or change in voice. No excessive gum bleeding.  NODES: Denies swollen glands.  CHEST: Denies M[place], cyanosis, wheezing, cough and sputum production.  CARDIOVASCULAR: Denies chest pain, PND, orthopnea or reduced exercise tolerance.  ABDOMEN: Appetite fine. No weight loss. Denies diarrhea, abdominal pain, hematemesis or blood in stool.  URINARY: No flank pain, dysuria or hematuria.  PERIPHERAL VASCULAR: No claudication or cyanosis.  MUSCULOSKELETAL: No joint stiffness or swelling. Denies back pain.  NEUROLOGIC: No history of seizures, paralysis, alteration of gait or coordination.    PE:   /81   Ht 5' 1" (1.549 m)   Wt 62.2 kg (137 lb 2 oz)   BMI 25.91 kg/m²   APPEARANCE: Well nourished, well developed, in no acute distress.  NECK: Neck symmetric without masses or thyromegaly.  BREASTS: Symmetrical, no skin changes or visible lesions. No palpable masses, nipple discharge or adenopathy bilaterally.  ABDOMEN: Flat. Soft. No tenderness or masses. No hepatosplenomegaly. No hernias. No CVA tenderness.  VULVA: No lesions. Normal female genitalia.  URETHRAL MEATUS: Normal size and location, no lesions, no prolapse.  URETHRA: No masses, tenderness, prolapse or scarring.  VAGINA: Moist and well rugated, no discharge, no significant " cystocele or rectocele.  CERVIX: No lesions and discharge. PAP done.  UTERUS: Normal size, regular shape, mobile, non-tender, bladder base nontender.  ADNEXA: No masses, tenderness or CDS nodularity.  ANUS PERINEUM: Normal.    PROCEDURES:  Pap smear    Assessment:  Normal Gynecologic Exam    Plan:  Mammogram and Colonoscopy if indicated by current recommendations.  Return to clinic in one year or for any problems or complaints.  Doing well  Will return for prolia shot  Osteoporosos 2016, penia 2018

## 2020-08-09 ENCOUNTER — LAB VISIT (OUTPATIENT)
Dept: FAMILY MEDICINE | Facility: CLINIC | Age: 65
End: 2020-08-09
Payer: MEDICARE

## 2020-08-09 DIAGNOSIS — Z01.818 PREOP TESTING: ICD-10-CM

## 2020-08-09 LAB — SARS-COV-2 RNA RESP QL NAA+PROBE: NOT DETECTED

## 2020-08-09 PROCEDURE — U0003 INFECTIOUS AGENT DETECTION BY NUCLEIC ACID (DNA OR RNA); SEVERE ACUTE RESPIRATORY SYNDROME CORONAVIRUS 2 (SARS-COV-2) (CORONAVIRUS DISEASE [COVID-19]), AMPLIFIED PROBE TECHNIQUE, MAKING USE OF HIGH THROUGHPUT TECHNOLOGIES AS DESCRIBED BY CMS-2020-01-R: HCPCS

## 2020-08-11 ENCOUNTER — ANESTHESIA EVENT (OUTPATIENT)
Dept: SURGERY | Facility: HOSPITAL | Age: 65
End: 2020-08-11
Payer: MEDICARE

## 2020-08-12 ENCOUNTER — ANESTHESIA (OUTPATIENT)
Dept: SURGERY | Facility: HOSPITAL | Age: 65
End: 2020-08-12
Payer: MEDICARE

## 2020-08-12 ENCOUNTER — HOSPITAL ENCOUNTER (OUTPATIENT)
Facility: HOSPITAL | Age: 65
Discharge: HOME | End: 2020-08-12
Attending: ORTHOPAEDIC SURGERY | Admitting: ORTHOPAEDIC SURGERY
Payer: MEDICARE

## 2020-08-12 VITALS
HEIGHT: 61 IN | TEMPERATURE: 98 F | RESPIRATION RATE: 16 BRPM | WEIGHT: 137.13 LBS | BODY MASS INDEX: 25.89 KG/M2 | OXYGEN SATURATION: 100 % | DIASTOLIC BLOOD PRESSURE: 74 MMHG | HEART RATE: 65 BPM | SYSTOLIC BLOOD PRESSURE: 128 MMHG

## 2020-08-12 DIAGNOSIS — R22.41 FOOT MASS, RIGHT: Primary | ICD-10-CM

## 2020-08-12 PROCEDURE — 28041 PR EXC TUMOR SOFT TISSUE FOOT/TOE SUBFASC 1.5+CM: ICD-10-PCS | Mod: 22,RT,, | Performed by: ORTHOPAEDIC SURGERY

## 2020-08-12 PROCEDURE — D9220A PRA ANESTHESIA: ICD-10-PCS | Mod: CRNA,,, | Performed by: NURSE ANESTHETIST, CERTIFIED REGISTERED

## 2020-08-12 PROCEDURE — 28041 EXC FOOT/TOE TUM DEP 1.5CM/>: CPT | Mod: 22,RT,, | Performed by: ORTHOPAEDIC SURGERY

## 2020-08-12 PROCEDURE — 36000707: Mod: PO | Performed by: ORTHOPAEDIC SURGERY

## 2020-08-12 PROCEDURE — D9220A PRA ANESTHESIA: Mod: ANES,,, | Performed by: ANESTHESIOLOGY

## 2020-08-12 PROCEDURE — 88304 TISSUE EXAM BY PATHOLOGIST: CPT | Performed by: PATHOLOGY

## 2020-08-12 PROCEDURE — 71000015 HC POSTOP RECOV 1ST HR: Mod: PO | Performed by: ORTHOPAEDIC SURGERY

## 2020-08-12 PROCEDURE — S0020 INJECTION, BUPIVICAINE HYDRO: HCPCS | Mod: PO | Performed by: ORTHOPAEDIC SURGERY

## 2020-08-12 PROCEDURE — 25000003 PHARM REV CODE 250: Mod: PO | Performed by: ANESTHESIOLOGY

## 2020-08-12 PROCEDURE — 36000706: Mod: PO | Performed by: ORTHOPAEDIC SURGERY

## 2020-08-12 PROCEDURE — D9220A PRA ANESTHESIA: ICD-10-PCS | Mod: ANES,,, | Performed by: ANESTHESIOLOGY

## 2020-08-12 PROCEDURE — 88304 TISSUE EXAM BY PATHOLOGIST: CPT | Mod: 26,,, | Performed by: PATHOLOGY

## 2020-08-12 PROCEDURE — 27200651 HC AIRWAY, LMA: Mod: PO | Performed by: ANESTHESIOLOGY

## 2020-08-12 PROCEDURE — 63600175 PHARM REV CODE 636 W HCPCS: Mod: PO | Performed by: ANESTHESIOLOGY

## 2020-08-12 PROCEDURE — 63600175 PHARM REV CODE 636 W HCPCS: Mod: PO | Performed by: ORTHOPAEDIC SURGERY

## 2020-08-12 PROCEDURE — 37000009 HC ANESTHESIA EA ADD 15 MINS: Mod: PO | Performed by: ORTHOPAEDIC SURGERY

## 2020-08-12 PROCEDURE — 88304 PR  SURG PATH,LEVEL III: ICD-10-PCS | Mod: 26,,, | Performed by: PATHOLOGY

## 2020-08-12 PROCEDURE — 25000003 PHARM REV CODE 250: Mod: PO | Performed by: ORTHOPAEDIC SURGERY

## 2020-08-12 PROCEDURE — 37000008 HC ANESTHESIA 1ST 15 MINUTES: Mod: PO | Performed by: ORTHOPAEDIC SURGERY

## 2020-08-12 PROCEDURE — 71000033 HC RECOVERY, INTIAL HOUR: Mod: PO | Performed by: ORTHOPAEDIC SURGERY

## 2020-08-12 PROCEDURE — 63600175 PHARM REV CODE 636 W HCPCS: Mod: PO | Performed by: NURSE ANESTHETIST, CERTIFIED REGISTERED

## 2020-08-12 PROCEDURE — D9220A PRA ANESTHESIA: Mod: CRNA,,, | Performed by: NURSE ANESTHETIST, CERTIFIED REGISTERED

## 2020-08-12 RX ORDER — LIDOCAINE HYDROCHLORIDE 20 MG/ML
INJECTION INTRAVENOUS
Status: DISCONTINUED | OUTPATIENT
Start: 2020-08-12 | End: 2020-08-12

## 2020-08-12 RX ORDER — SODIUM CHLORIDE, SODIUM LACTATE, POTASSIUM CHLORIDE, CALCIUM CHLORIDE 600; 310; 30; 20 MG/100ML; MG/100ML; MG/100ML; MG/100ML
INJECTION, SOLUTION INTRAVENOUS CONTINUOUS
Status: DISCONTINUED | OUTPATIENT
Start: 2020-08-12 | End: 2020-08-12 | Stop reason: HOSPADM

## 2020-08-12 RX ORDER — OXYCODONE HYDROCHLORIDE 5 MG/1
5 TABLET ORAL
Status: DISCONTINUED | OUTPATIENT
Start: 2020-08-12 | End: 2020-08-12 | Stop reason: HOSPADM

## 2020-08-12 RX ORDER — HYDROCODONE BITARTRATE AND ACETAMINOPHEN 5; 325 MG/1; MG/1
1 TABLET ORAL EVERY 4 HOURS PRN
Qty: 24 TABLET | Refills: 0 | Status: SHIPPED | OUTPATIENT
Start: 2020-08-12 | End: 2020-10-12

## 2020-08-12 RX ORDER — ONDANSETRON 2 MG/ML
INJECTION INTRAMUSCULAR; INTRAVENOUS
Status: DISCONTINUED | OUTPATIENT
Start: 2020-08-12 | End: 2020-08-12

## 2020-08-12 RX ORDER — MIDAZOLAM HYDROCHLORIDE 1 MG/ML
INJECTION, SOLUTION INTRAMUSCULAR; INTRAVENOUS
Status: DISCONTINUED | OUTPATIENT
Start: 2020-08-12 | End: 2020-08-12

## 2020-08-12 RX ORDER — DIPHENHYDRAMINE HYDROCHLORIDE 50 MG/ML
25 INJECTION INTRAMUSCULAR; INTRAVENOUS EVERY 6 HOURS PRN
Status: DISCONTINUED | OUTPATIENT
Start: 2020-08-12 | End: 2020-08-12 | Stop reason: HOSPADM

## 2020-08-12 RX ORDER — MEPERIDINE HYDROCHLORIDE 50 MG/ML
12.5 INJECTION INTRAMUSCULAR; INTRAVENOUS; SUBCUTANEOUS ONCE AS NEEDED
Status: DISCONTINUED | OUTPATIENT
Start: 2020-08-12 | End: 2020-08-12 | Stop reason: HOSPADM

## 2020-08-12 RX ORDER — LIDOCAINE HYDROCHLORIDE 10 MG/ML
1 INJECTION, SOLUTION EPIDURAL; INFILTRATION; INTRACAUDAL; PERINEURAL ONCE
Status: DISCONTINUED | OUTPATIENT
Start: 2020-08-12 | End: 2020-08-12 | Stop reason: HOSPADM

## 2020-08-12 RX ORDER — MUPIROCIN 20 MG/G
OINTMENT TOPICAL
Status: DISCONTINUED | OUTPATIENT
Start: 2020-08-12 | End: 2020-08-12 | Stop reason: HOSPADM

## 2020-08-12 RX ORDER — DEXAMETHASONE SODIUM PHOSPHATE 4 MG/ML
8 INJECTION, SOLUTION INTRA-ARTICULAR; INTRALESIONAL; INTRAMUSCULAR; INTRAVENOUS; SOFT TISSUE
Status: COMPLETED | OUTPATIENT
Start: 2020-08-12 | End: 2020-08-12

## 2020-08-12 RX ORDER — CEFAZOLIN SODIUM 2 G/50ML
2 SOLUTION INTRAVENOUS
Status: COMPLETED | OUTPATIENT
Start: 2020-08-12 | End: 2020-08-12

## 2020-08-12 RX ORDER — PROPOFOL 10 MG/ML
VIAL (ML) INTRAVENOUS
Status: DISCONTINUED | OUTPATIENT
Start: 2020-08-12 | End: 2020-08-12

## 2020-08-12 RX ORDER — LIDOCAINE HYDROCHLORIDE 20 MG/ML
INJECTION, SOLUTION EPIDURAL; INFILTRATION; INTRACAUDAL; PERINEURAL
Status: DISCONTINUED | OUTPATIENT
Start: 2020-08-12 | End: 2020-08-12 | Stop reason: HOSPADM

## 2020-08-12 RX ORDER — ONDANSETRON 4 MG/1
8 TABLET, ORALLY DISINTEGRATING ORAL EVERY 8 HOURS PRN
Qty: 12 TABLET | Refills: 0 | Status: SHIPPED | OUTPATIENT
Start: 2020-08-12 | End: 2022-07-07 | Stop reason: SDUPTHER

## 2020-08-12 RX ORDER — BUPIVACAINE HYDROCHLORIDE 5 MG/ML
INJECTION, SOLUTION EPIDURAL; INTRACAUDAL
Status: DISCONTINUED | OUTPATIENT
Start: 2020-08-12 | End: 2020-08-12 | Stop reason: HOSPADM

## 2020-08-12 RX ORDER — ACETAMINOPHEN 10 MG/ML
INJECTION, SOLUTION INTRAVENOUS
Status: DISCONTINUED | OUTPATIENT
Start: 2020-08-12 | End: 2020-08-12

## 2020-08-12 RX ORDER — HYDROMORPHONE HYDROCHLORIDE 2 MG/ML
0.2 INJECTION, SOLUTION INTRAMUSCULAR; INTRAVENOUS; SUBCUTANEOUS EVERY 5 MIN PRN
Status: DISCONTINUED | OUTPATIENT
Start: 2020-08-12 | End: 2020-08-12 | Stop reason: HOSPADM

## 2020-08-12 RX ORDER — FENTANYL CITRATE 50 UG/ML
INJECTION, SOLUTION INTRAMUSCULAR; INTRAVENOUS
Status: DISCONTINUED | OUTPATIENT
Start: 2020-08-12 | End: 2020-08-12

## 2020-08-12 RX ORDER — FENTANYL CITRATE 50 UG/ML
25 INJECTION, SOLUTION INTRAMUSCULAR; INTRAVENOUS EVERY 5 MIN PRN
Status: DISCONTINUED | OUTPATIENT
Start: 2020-08-12 | End: 2020-08-12 | Stop reason: HOSPADM

## 2020-08-12 RX ORDER — SODIUM CHLORIDE 0.9 % (FLUSH) 0.9 %
3 SYRINGE (ML) INJECTION
Status: DISCONTINUED | OUTPATIENT
Start: 2020-08-12 | End: 2020-08-12 | Stop reason: HOSPADM

## 2020-08-12 RX ADMIN — FENTANYL CITRATE 50 MCG: 50 INJECTION, SOLUTION INTRAMUSCULAR; INTRAVENOUS at 07:08

## 2020-08-12 RX ADMIN — MIDAZOLAM 2 MG: 1 INJECTION INTRAMUSCULAR; INTRAVENOUS at 06:08

## 2020-08-12 RX ADMIN — ONDANSETRON 4 MG: 2 INJECTION, SOLUTION INTRAMUSCULAR; INTRAVENOUS at 07:08

## 2020-08-12 RX ADMIN — OXYCODONE 5 MG: 5 TABLET ORAL at 08:08

## 2020-08-12 RX ADMIN — ACETAMINOPHEN 1000 MG: 10 INJECTION, SOLUTION INTRAVENOUS at 07:08

## 2020-08-12 RX ADMIN — LIDOCAINE HYDROCHLORIDE 75 MG: 20 INJECTION, SOLUTION INTRAVENOUS at 07:08

## 2020-08-12 RX ADMIN — CEFAZOLIN SODIUM 2 G: 2 SOLUTION INTRAVENOUS at 06:08

## 2020-08-12 RX ADMIN — DEXAMETHASONE SODIUM PHOSPHATE 8 MG: 4 INJECTION, SOLUTION INTRAMUSCULAR; INTRAVENOUS at 07:08

## 2020-08-12 RX ADMIN — SODIUM CHLORIDE, SODIUM LACTATE, POTASSIUM CHLORIDE, AND CALCIUM CHLORIDE: .6; .31; .03; .02 INJECTION, SOLUTION INTRAVENOUS at 06:08

## 2020-08-12 RX ADMIN — PROPOFOL 170 MG: 10 INJECTION, EMULSION INTRAVENOUS at 07:08

## 2020-08-12 NOTE — TRANSFER OF CARE
"Anesthesia Transfer of Care Note    Patient: Kem Rousseau    Procedure(s) Performed: Procedure(s) (LRB):  EXCISION, MASS, EXTREMITY (Right)    Patient location: PACU    Anesthesia Type: general    Transport from OR: Transported from OR on room air with adequate spontaneous ventilation    Post pain: adequate analgesia    Post assessment: no apparent anesthetic complications and tolerated procedure well    Post vital signs: stable    Level of consciousness: awake and alert    Nausea/Vomiting: no nausea/vomiting    Complications: none    Transfer of care protocol was followed      Last vitals:   Visit Vitals  /65   Pulse 68   Temp 36.2 °C (97.2 °F) (Skin)   Resp 16   Ht 5' 1" (1.549 m)   Wt 62.2 kg (137 lb 2 oz)   SpO2 95%   Breastfeeding No   BMI 25.91 kg/m²     "

## 2020-08-12 NOTE — BRIEF OP NOTE
OPERATIVE NOTE    DATE:  8/12/2020  TIME:  7:57 AM     PATIENT NAME:  Kem Rousseau     PRE-OPERATIVE DIAGNOSIS: Right foot recurrent mass    POST-OPERATIVE DIAGNOSIS:  Right foot recurrent mass    PROCEDURE: Repeat Excision of right foot mass    SURGEON: Xavier Duque MD    ANESTHESIA TYPE: LMA, ankle block    SPECIMENS: Mass from right foot    COMPLICATIONS: None     BLOOD LOSS: 5 cc     ASSISTANT: CARLO Holguin

## 2020-08-12 NOTE — DISCHARGE INSTRUCTIONS
Post Operative Instructions    Dressing:    You will have a soft dressing on the foot following surgery    The dressing will remain in place for  2 weeks    You will be Weight bearing as tolerated on the heel in a darco shoe for the first 2 weeks.      Wound:    The surgical incision has been closed with sutures    Do not get the dressing/splint wet and do not remove the dressing/splint for 2 weeks. When showering place a bag over the dressing and secure with tape or rubberband to your leg to avoid the cast and wound getting wet and still put the leg out of the shower      Do not remove the dressing until your 2 week appointment - the first dressing change will occur at your 2 week appointment    Stitches will be removed at your 2 week appointment if the incision is healed    Once the dressing is removed and sutures are removed you can shower and wash the foot     Do not immerse the foot in water (bath, hot tub, pool, lake, pond, river, ocean) for 4 weeks    You will apply scar cream and pain cream together to the incisions once in the boot    Weight Bearing:    You will be weight bearing on the heel for the first two weeks in the darco shoe.  You will be given crutches or a walker    After 2-4 weeks you may begin fully walking on the foot    Medications:    You will be given a prescription for pain medication     Pain medication should be used regularly for the first 24-48 hours, when required for the first 1 to 2 weeks, followed by Regular Tylenol     Driving:     For right foot surgery you are not permitted to drive for 6 weeks    For left foot surgery, please contact your insurance company to see if you are permitted to drive    Driving is not permitted while on narcotics    Work:    Two weeks off work is recommended for initial recovery    If you are able to get to work safely, and will be seated with the foot elevated for the majority of the day, you may return to work a couple days after surgery. This is  assuming you are not taking narcotic pain mediation.    From 2-6 weeks sedentary duties is recommended    By 6 weeks you can slowly return to your normal duties    If your job is physically demanding, return to full duties is usually possible around 12 weeks post operatively    Follow Up:    You will have your first appointment 2 weeks after surgery in the Clinic      Recovery:    It is normal to experience mild to moderate pain, numbness, or tingling for the first 2 weeks following surgery    Please come to the emergency department if you are suffering from severe pain    You will get back to most of your activities by 3 months    Swelling often remains for 6-12 months    You are expected to experience a maximal improvement from surgery in 9-12 months    Physiotherapy:    Formal physiotherapy is usually not necessary     Do not remove your dressing or let anyone else including a physician remove your dressing unless otherwise instructed by Dr. Duque. You may over wrap the dressing if there is any blood or fluid oozing through the ace bandage.     Ok to resume NSAIDS POD#3

## 2020-08-12 NOTE — ANESTHESIA POSTPROCEDURE EVALUATION
Anesthesia Post Evaluation    Patient: Kem Rousseau    Procedure(s) Performed: Procedure(s) (LRB):  EXCISION, MASS, EXTREMITY (Right)    Final Anesthesia Type: general    Patient location during evaluation: PACU  Patient participation: Yes- Able to Participate  Level of consciousness: awake and alert and oriented  Post-procedure vital signs: reviewed and stable  Pain management: adequate  Airway patency: patent    PONV status at discharge: No PONV  Anesthetic complications: no      Cardiovascular status: blood pressure returned to baseline and stable  Respiratory status: unassisted and spontaneous ventilation  Hydration status: euvolemic  Follow-up not needed.          Vitals Value Taken Time   /60 08/12/20 0816   Temp 36.6 °C (97.8 °F) 08/12/20 0756   Pulse 62 08/12/20 0816   Resp 16 08/12/20 0820   SpO2 100 % 08/12/20 0816         No case tracking events are documented in the log.      Pain/Daniel Score: Pain Rating Prior to Med Admin: 4 (8/12/2020  8:20 AM)  Daniel Score: 10 (8/12/2020  7:56 AM)

## 2020-08-12 NOTE — ANESTHESIA PROCEDURE NOTES
Intubation  Performed by: Stewart Melgoza CRNA  Authorized by: Rome Zhu MD     Intubation:     Induction:  Intravenous    Intubated:  Postinduction    Mask Ventilation:  Easy mask    Attempts:  1    Attempted By:  CRNA    Difficult Airway Encountered?: No      Complications:  None    Airway Device:  Supraglottic airway/LMA    Airway Device Size:  4.0    Style/Cuff Inflation:  Cuffed    Inflation Amount (mL):  28    Secured at:  The lips    Placement Verified By:  Capnometry    Complicating Factors:  None    Findings Post-Intubation:  Atraumatic/condition of teeth unchanged and BS equal bilateral

## 2020-08-12 NOTE — H&P
"HPI: Kem Rousseau is a 64 y.o. female who was seen for f/u  today for right foot painful mass for several years. She said she had a bone spur of some type removed from her foot and grafted by Dr. Contreras Pelayo DPM in 2014 which was the second surgery on the area. She said she has noticed the mass increasing in size over the past couple of years. It is painful with shoewear and swells all the time. She does have some numbness in the great toe dorsally since surgery. She is here to f/u on her MRI results and discuss a plan for surgery.      PAST MEDICAL/SURGICAL/FAMILY/SOCIAL/ HISTORY: REVIEWED    ALLERGIES/MEDICATIONS: REVIEWED         Review of Systems:     Constitution: Negative.   HEENT: Negative.   Eyes: Negative.   Cardiovascular: Negative.   Respiratory: Negative.   Endocrine: Negative.   Hematologic/Lymphatic: Negative.   Skin: Negative.   Musculoskeletal: Positive for right foot pain   Gastrointestinal: Negative.   Genitourinary: Negative.   Neurological: Negative.   Psychiatric/Behavioral: Negative.   Allergic/Immunologic: Negative.         PHYSICAL EXAM:      Vitals:     07/02/20 1540   BP: 132/79   Pulse: 62         Ht Readings from Last 1 Encounters:   07/02/20 5' 1" (1.549 m)         Wt Readings from Last 1 Encounters:   07/02/20 61.2 kg (134 lb 14.7 oz)           GENERAL: Well developed, well nourished, no acute distress.  Very pleasant.   SKIN: Skin is intact. No atrophy, abrasions or lesions are noted.   Neurological: Normal mental status. Appropriate and conversant. Alert and oriented x 3.  GAIT: Walks with non-antalgic gait.     Right lower extremity compared with LLE:  2+ dorsalis pedis pulse.  Capillary refill < 3 seconds.  Normal range of motion tibiotalar and subtalar joints. Normal alignment of the forefoot and the hindfoot.  5/5 strength EHL, FHL, tibialis anterior, gastrocsoleus, tibialis posterior and peroneals. Sensation to light touch intact sural, saphenous, superficial peroneal and " deep peroneal nerves. + swelling of the forefoot in the area of previous surgery on the dorsum of the 1st metatarsal.  Normal range of motion of the 1st mtpj without pain. She has a large mass that is firm and somewhat mobile in the area. She does have numbness on the lateral aspect of the great toe and at the incision laterally.      XRAYS:   3 views of right foot obtained and reviewed today reveal No evidence of fractures or dislocations. Mild changes in the mid shaft of the 1st metatarsal c/w previous surgery.         ASSESSMENT: Right foot mass    PLAN:   I spent 15 minutes in consulation with the patient today. More than half the time was spent counseling the patient on her condition and the options for operative versus non-operative care.  I recommend excision of right foot mass. We discussed possibility of recurrence as well.   I have explained the procedure, including indications, risks, and alternatives.  Kem MOSLEY Alizatessa gave informed consent and is medically ready for surgery. To OR 8/12/20.

## 2020-08-12 NOTE — ANESTHESIA PREPROCEDURE EVALUATION
08/12/2020  Kem Rousseau is a 64 y.o., female.    Anesthesia Evaluation    I have reviewed the Patient Summary Reports.    I have reviewed the Nursing Notes.    I have reviewed the Medications.     Review of Systems  Anesthesia Hx:  Hx of Anesthetic complications (PONV)    Social:  Former Smoker    Cardiovascular:  Cardiovascular Normal     Pulmonary:  Pulmonary Normal    Renal/:  Renal/ Normal     Hepatic/GI:   GERD, well controlled    Neurological:   Headaches    Endocrine:   Hypothyroidism    Psych:   Psychiatric History anxiety          Physical Exam  General:  Well nourished    Airway/Jaw/Neck:  Airway Findings: Mouth Opening: Normal Tongue: Normal  General Airway Assessment: Adult  Oropharynx Findings:  Mallampati: II  Jaw/Neck Findings:  Neck ROM: Normal ROM     Eyes/Ears/Nose:  Eyes/Ears/Nose Findings:    Dental:  Dental Findings:   Chest/Lungs:  Chest/Lungs Findings: Normal Respiratory Rate     Heart/Vascular:  Heart Findings: Rate: Normal  Rhythm: Regular Rhythm        Mental Status:  Mental Status Findings:  Cooperative, Alert and Oriented         Anesthesia Plan  Type of Anesthesia, risks & benefits discussed:  Anesthesia Type:  general  Patient's Preference:   Intra-op Monitoring Plan: standard ASA monitors  Intra-op Monitoring Plan Comments:   Post Op Pain Control Plan: multimodal analgesia  Post Op Pain Control Plan Comments:   Induction:   IV  Beta Blocker:  Patient is not currently on a Beta-Blocker (No further documentation required).       Informed Consent: Patient understands risks and agrees with Anesthesia plan.  Questions answered. Anesthesia consent signed with patient.  ASA Score: 2     Day of Surgery Review of History & Physical:            Ready For Surgery From Anesthesia Perspective.

## 2020-08-12 NOTE — OP NOTE
OPERATIVE NOTE    DATE:  8/12/2020  TIME:  7:57 AM     PATIENT NAME:  Kem Rousseau     PRE-OPERATIVE DIAGNOSIS: Right foot recurrent mass    POST-OPERATIVE DIAGNOSIS:  Right foot recurrent mass    PROCEDURE: Repeat Excision of right foot mass    SURGEON: Xavier Duque MD    ANESTHESIA TYPE: LMA, ankle block    SPECIMENS: Mass from right foot    COMPLICATIONS: None     BLOOD LOSS: 5 cc     ASSISTANT: CARLO Holguin      PROCEDURE IN DETAIL: This case had increased difficulty due to the severe scar tissue as this husam the third time this mass is being removed.  This prolonged the operative time and required increased need for technical surgical skills.    After appropriate informed consent was obtained the patient was taken to the OR and placed in the supine position on the operating table. The right lower extremity was prepped and draped in the usual sterile fashion. The tourniquet was raised to 300 mmHg after esmarch exsanguination of the limb.   Ankle block was administered using 25 cc of a 1:1 mixture of 1% lidocaine plain and 0.25% Marcaine plain.    An approximately, 6-cm longitudinal  incision was made using a #15 blade on the dorsal aspect of the right foot overlyingmass and inline with previous surgical scar.  The mass was carefully exposed and dissected out using Litler scissors.  It was very firm and poorly circumscribed.  The mass was completely removed and sent for specimen. The mass enveloped the lateral dorsal cutaneous nerve to the great toe which was completely invested in the mass. It was dissected out and did not appear to be intact.  It was retracted proximally and sharply transected from the mass in order to prevent neuroma.  The mass was approximately 3 cm diameter and deep to the fascia. It also invested the EHL and the EHB tendons. It dove deep into the 1st webspace as well. The bed of the cyst and the interosseous muscle was bovied.  The incision was irrigated with normal saline.  The tourniquet was let down, adequate hemostasis was achieved using bovie cautery. The deep layer was re-approximated using 2.0 monocryl interrupted sutures.  Subcutaneous layer was re-approximated using 3.0-monocyrl in an interrupted fashion. The skin was re-approximated using 3.0 nylon running sutures. Sterile dressing using xeroform, bacitracin, 4x8s, cast padding and ace wrap was placed.  Patient tolerated the procedure well without complications.

## 2020-08-13 LAB
FINAL PATHOLOGIC DIAGNOSIS: NORMAL
Lab: NORMAL

## 2020-08-19 ENCOUNTER — OFFICE VISIT (OUTPATIENT)
Dept: OBSTETRICS AND GYNECOLOGY | Facility: CLINIC | Age: 65
End: 2020-08-19
Payer: MEDICARE

## 2020-08-19 DIAGNOSIS — M81.0 OSTEOPOROSIS, UNSPECIFIED OSTEOPOROSIS TYPE, UNSPECIFIED PATHOLOGICAL FRACTURE PRESENCE: Primary | ICD-10-CM

## 2020-08-19 PROCEDURE — 99499 NO LOS: ICD-10-PCS | Mod: S$PBB,,, | Performed by: OBSTETRICS & GYNECOLOGY

## 2020-08-19 PROCEDURE — 99499 UNLISTED E&M SERVICE: CPT | Mod: S$PBB,,, | Performed by: OBSTETRICS & GYNECOLOGY

## 2020-08-23 LAB
FINAL PATHOLOGIC DIAGNOSIS: NORMAL
GROSS: NORMAL

## 2020-08-27 ENCOUNTER — OFFICE VISIT (OUTPATIENT)
Dept: ORTHOPEDICS | Facility: CLINIC | Age: 65
End: 2020-08-27
Payer: MEDICARE

## 2020-08-27 VITALS
BODY MASS INDEX: 25.89 KG/M2 | WEIGHT: 137.13 LBS | HEART RATE: 68 BPM | HEIGHT: 61 IN | SYSTOLIC BLOOD PRESSURE: 137 MMHG | DIASTOLIC BLOOD PRESSURE: 65 MMHG

## 2020-08-27 DIAGNOSIS — R22.41 FOOT MASS, RIGHT: Primary | ICD-10-CM

## 2020-08-27 PROCEDURE — 99999 PR PBB SHADOW E&M-EST. PATIENT-LVL IV: CPT | Mod: PBBFAC,,, | Performed by: ORTHOPAEDIC SURGERY

## 2020-08-27 PROCEDURE — 99024 PR POST-OP FOLLOW-UP VISIT: ICD-10-PCS | Mod: POP,,, | Performed by: ORTHOPAEDIC SURGERY

## 2020-08-27 PROCEDURE — 99024 POSTOP FOLLOW-UP VISIT: CPT | Mod: POP,,, | Performed by: ORTHOPAEDIC SURGERY

## 2020-08-27 PROCEDURE — 99214 OFFICE O/P EST MOD 30 MIN: CPT | Mod: PBBFAC,PN | Performed by: ORTHOPAEDIC SURGERY

## 2020-08-27 PROCEDURE — 99999 PR PBB SHADOW E&M-EST. PATIENT-LVL IV: ICD-10-PCS | Mod: PBBFAC,,, | Performed by: ORTHOPAEDIC SURGERY

## 2020-08-27 NOTE — NURSING
Per Dr. Duque's orders to remove sutures. Sutures to right lower extremity were removed without any complications. Incision remained intact. Benzoin tincture applied to outer edges of incision, steri strips applied. Instructed patient to remove steri strips on day 3 if they have not fallen off on own. Patient stated understanding.

## 2020-08-27 NOTE — PROGRESS NOTES
Subjective:      Patient ID: Kem Rousseau is a 64 y.o. female.    Chief Complaint: Post-op Evaluation (s/p right foot repeat mass excision 2020)    Ms Brownlee is doing very well today. She rates her pain as 0/10 today.   She says her foot looks normal again and she is very pleased.   Social History     Occupational History    Not on file   Tobacco Use    Smoking status: Former Smoker     Packs/day: 0.33     Years: 10.00     Pack years: 3.30     Types: Cigarettes     Quit date: 1988     Years since quittin.6    Smokeless tobacco: Never Used   Substance and Sexual Activity    Alcohol use: Yes     Frequency: 2-4 times a month     Drinks per session: 1 or 2     Binge frequency: Never     Comment: very little    Drug use: Never    Sexual activity: Yes     Partners: Male     Birth control/protection: Post-menopausal, None            Objective:    Ortho Exam     RLE: neurovascularly intact, numbness only on lateral aspect of hallux which was present pre-op. Incision healing well, moderate swelling, sutures are intact.  No signs of infection.    Pathology showed a neuroma    Assessment:     Post-op Evaluation (s/p right foot repeat mass excision 2020)      Plan:       Sutures were removed today and steri-strips were placed. Weight bearing as tolerated in regular shoe or darco shoe. I explained with them that it may have been from old trauma to the nerve from her previous two surgeries but it is hard to say.   F/u 3 weeks, no xray.

## 2020-09-10 ENCOUNTER — PATIENT MESSAGE (OUTPATIENT)
Dept: ORTHOPEDICS | Facility: CLINIC | Age: 65
End: 2020-09-10

## 2020-09-15 ENCOUNTER — PATIENT MESSAGE (OUTPATIENT)
Dept: ORTHOPEDICS | Facility: CLINIC | Age: 65
End: 2020-09-15

## 2020-09-21 ENCOUNTER — OFFICE VISIT (OUTPATIENT)
Dept: ORTHOPEDICS | Facility: CLINIC | Age: 65
End: 2020-09-21
Payer: MEDICARE

## 2020-09-21 VITALS
BODY MASS INDEX: 25.89 KG/M2 | SYSTOLIC BLOOD PRESSURE: 159 MMHG | WEIGHT: 137.13 LBS | HEART RATE: 64 BPM | DIASTOLIC BLOOD PRESSURE: 70 MMHG | HEIGHT: 61 IN

## 2020-09-21 DIAGNOSIS — R22.41 FOOT MASS, RIGHT: Primary | ICD-10-CM

## 2020-09-21 PROCEDURE — 99024 PR POST-OP FOLLOW-UP VISIT: ICD-10-PCS | Mod: POP,,, | Performed by: ORTHOPAEDIC SURGERY

## 2020-09-21 PROCEDURE — 99024 POSTOP FOLLOW-UP VISIT: CPT | Mod: POP,,, | Performed by: ORTHOPAEDIC SURGERY

## 2020-09-21 PROCEDURE — 99999 PR PBB SHADOW E&M-EST. PATIENT-LVL III: ICD-10-PCS | Mod: PBBFAC,,, | Performed by: ORTHOPAEDIC SURGERY

## 2020-09-21 PROCEDURE — 99213 OFFICE O/P EST LOW 20 MIN: CPT | Mod: PBBFAC,PN | Performed by: ORTHOPAEDIC SURGERY

## 2020-09-21 PROCEDURE — 99999 PR PBB SHADOW E&M-EST. PATIENT-LVL III: CPT | Mod: PBBFAC,,, | Performed by: ORTHOPAEDIC SURGERY

## 2020-09-22 NOTE — PROGRESS NOTES
Subjective:      Patient ID: Kem Rousseau is a 64 y.o. female.    Chief Complaint: Pain and Post-op Evaluation of the Right Foot and Post-op Evaluation (repeat mass exision)    Ms Brownlee is doing very well today. She rates her pain as 0/10 today.     Social History     Occupational History    Not on file   Tobacco Use    Smoking status: Former Smoker     Packs/day: 0.33     Years: 10.00     Pack years: 3.30     Types: Cigarettes     Quit date: 1988     Years since quittin.7    Smokeless tobacco: Never Used   Substance and Sexual Activity    Alcohol use: Yes     Frequency: 2-4 times a month     Drinks per session: 1 or 2     Binge frequency: Never     Comment: very little    Drug use: Never    Sexual activity: Yes     Partners: Male     Birth control/protection: Post-menopausal, None            Objective:    Ortho Exam     RLE: neurovascularly intact, numbness only on lateral aspect of hallux which was present pre-op. Incision well healed, mild swelling,   Moderate scar tissue palpable.     Assessment:     Post-op Evaluation (s/p right foot repeat mass excision 2020)      Plan:      scar tissue massage. Gradual return to activities. F/u prn.

## 2021-01-22 ENCOUNTER — PATIENT MESSAGE (OUTPATIENT)
Dept: ADMINISTRATIVE | Facility: OTHER | Age: 66
End: 2021-01-22

## 2021-05-04 ENCOUNTER — PATIENT MESSAGE (OUTPATIENT)
Dept: RESEARCH | Facility: HOSPITAL | Age: 66
End: 2021-05-04

## 2021-05-21 ENCOUNTER — OFFICE VISIT (OUTPATIENT)
Dept: GASTROENTEROLOGY | Facility: CLINIC | Age: 66
End: 2021-05-21
Payer: MEDICARE

## 2021-05-21 VITALS — HEIGHT: 62 IN | WEIGHT: 140 LBS | BODY MASS INDEX: 25.76 KG/M2

## 2021-05-21 DIAGNOSIS — R14.0 ABDOMINAL DISTENSION: ICD-10-CM

## 2021-05-21 DIAGNOSIS — Z51.81 ENCOUNTER FOR MONITORING LONG-TERM PROTON PUMP INHIBITOR THERAPY: ICD-10-CM

## 2021-05-21 DIAGNOSIS — R10.13 POSTPRANDIAL EPIGASTRIC PAIN: Primary | ICD-10-CM

## 2021-05-21 DIAGNOSIS — R14.0 ABDOMINAL BLOATING: ICD-10-CM

## 2021-05-21 DIAGNOSIS — Z79.899 ENCOUNTER FOR MONITORING LONG-TERM PROTON PUMP INHIBITOR THERAPY: ICD-10-CM

## 2021-05-21 DIAGNOSIS — K21.9 GASTROESOPHAGEAL REFLUX DISEASE, UNSPECIFIED WHETHER ESOPHAGITIS PRESENT: ICD-10-CM

## 2021-05-21 PROCEDURE — 99204 PR OFFICE/OUTPT VISIT, NEW, LEVL IV, 45-59 MIN: ICD-10-PCS | Mod: 95,,, | Performed by: INTERNAL MEDICINE

## 2021-05-21 PROCEDURE — 99204 OFFICE O/P NEW MOD 45 MIN: CPT | Mod: 95,,, | Performed by: INTERNAL MEDICINE

## 2021-05-24 ENCOUNTER — PATIENT MESSAGE (OUTPATIENT)
Dept: OBSTETRICS AND GYNECOLOGY | Facility: CLINIC | Age: 66
End: 2021-05-24

## 2021-05-25 ENCOUNTER — HOSPITAL ENCOUNTER (OUTPATIENT)
Dept: RADIOLOGY | Facility: HOSPITAL | Age: 66
Discharge: HOME | End: 2021-05-25
Attending: INTERNAL MEDICINE
Payer: MEDICARE

## 2021-05-25 DIAGNOSIS — Z51.81 ENCOUNTER FOR MONITORING LONG-TERM PROTON PUMP INHIBITOR THERAPY: ICD-10-CM

## 2021-05-25 DIAGNOSIS — K21.9 GASTROESOPHAGEAL REFLUX DISEASE, UNSPECIFIED WHETHER ESOPHAGITIS PRESENT: ICD-10-CM

## 2021-05-25 DIAGNOSIS — R10.13 POSTPRANDIAL EPIGASTRIC PAIN: ICD-10-CM

## 2021-05-25 DIAGNOSIS — R14.0 ABDOMINAL BLOATING: ICD-10-CM

## 2021-05-25 DIAGNOSIS — Z79.899 ENCOUNTER FOR MONITORING LONG-TERM PROTON PUMP INHIBITOR THERAPY: ICD-10-CM

## 2021-05-25 DIAGNOSIS — R14.0 ABDOMINAL DISTENSION: ICD-10-CM

## 2021-05-25 PROCEDURE — 74178 CT ABD&PLV WO CNTR FLWD CNTR: CPT | Mod: 26,,, | Performed by: RADIOLOGY

## 2021-05-25 PROCEDURE — 25500020 PHARM REV CODE 255: Mod: PO | Performed by: INTERNAL MEDICINE

## 2021-05-25 PROCEDURE — 74178 CT ABDOMEN PELVIS W WO CONTRAST: ICD-10-PCS | Mod: 26,,, | Performed by: RADIOLOGY

## 2021-05-25 PROCEDURE — 74178 CT ABD&PLV WO CNTR FLWD CNTR: CPT | Mod: TC,PO

## 2021-05-25 RX ADMIN — IOHEXOL 75 ML: 350 INJECTION, SOLUTION INTRAVENOUS at 11:05

## 2021-06-01 DIAGNOSIS — Z23 ENCOUNTER FOR VACCINATION: Primary | ICD-10-CM

## 2021-06-05 ENCOUNTER — PATIENT MESSAGE (OUTPATIENT)
Dept: OBSTETRICS AND GYNECOLOGY | Facility: CLINIC | Age: 66
End: 2021-06-05

## 2021-06-05 ENCOUNTER — PATIENT MESSAGE (OUTPATIENT)
Dept: GASTROENTEROLOGY | Facility: CLINIC | Age: 66
End: 2021-06-05

## 2021-06-06 PROBLEM — K76.0 FATTY LIVER: Chronic | Status: ACTIVE | Noted: 2021-06-06

## 2021-06-08 DIAGNOSIS — R10.13 EPIGASTRIC PAIN: Primary | ICD-10-CM

## 2021-06-10 ENCOUNTER — LAB VISIT (OUTPATIENT)
Dept: LAB | Facility: HOSPITAL | Age: 66
End: 2021-06-10
Attending: INTERNAL MEDICINE
Payer: MEDICARE

## 2021-06-10 DIAGNOSIS — R10.13 EPIGASTRIC PAIN: ICD-10-CM

## 2021-06-10 PROCEDURE — 36415 COLL VENOUS BLD VENIPUNCTURE: CPT | Mod: PO | Performed by: INTERNAL MEDICINE

## 2021-06-10 PROCEDURE — 86790 VIRUS ANTIBODY NOS: CPT | Performed by: INTERNAL MEDICINE

## 2021-06-11 LAB — HEPATITIS A ANTIBODY, IGG: NEGATIVE

## 2021-06-13 DIAGNOSIS — K21.9 GASTROESOPHAGEAL REFLUX DISEASE, UNSPECIFIED WHETHER ESOPHAGITIS PRESENT: Primary | ICD-10-CM

## 2021-06-14 ENCOUNTER — TELEPHONE (OUTPATIENT)
Dept: GASTROENTEROLOGY | Facility: CLINIC | Age: 66
End: 2021-06-14

## 2021-06-21 ENCOUNTER — CLINICAL SUPPORT (OUTPATIENT)
Dept: INFECTIOUS DISEASES | Facility: CLINIC | Age: 66
End: 2021-06-21
Payer: MEDICARE

## 2021-06-21 DIAGNOSIS — Z23 ENCOUNTER FOR VACCINATION: ICD-10-CM

## 2021-06-21 DIAGNOSIS — K21.9 GASTROESOPHAGEAL REFLUX DISEASE, UNSPECIFIED WHETHER ESOPHAGITIS PRESENT: ICD-10-CM

## 2021-06-21 PROCEDURE — 90739 HEPB VACC 2/4 DOSE ADULT IM: CPT | Mod: PBBFAC

## 2021-06-21 PROCEDURE — 90632 HEPA VACCINE ADULT IM: CPT | Mod: PBBFAC

## 2021-07-30 DIAGNOSIS — M81.0 OSTEOPOROSIS, UNSPECIFIED OSTEOPOROSIS TYPE, UNSPECIFIED PATHOLOGICAL FRACTURE PRESENCE: Primary | ICD-10-CM

## 2021-08-02 RX ORDER — DENOSUMAB 60 MG/ML
60 INJECTION SUBCUTANEOUS
Qty: 1 ML | Refills: 0 | Status: SHIPPED | OUTPATIENT
Start: 2021-08-02 | End: 2021-09-15

## 2021-08-06 ENCOUNTER — PATIENT MESSAGE (OUTPATIENT)
Dept: OBSTETRICS AND GYNECOLOGY | Facility: CLINIC | Age: 66
End: 2021-08-06

## 2021-08-06 DIAGNOSIS — M85.80 OSTEOPENIA AFTER MENOPAUSE: Primary | ICD-10-CM

## 2021-08-06 DIAGNOSIS — Z78.0 OSTEOPENIA AFTER MENOPAUSE: Primary | ICD-10-CM

## 2021-08-06 DIAGNOSIS — M80.08XA AGE-RELATED OSTEOPOROSIS WITH CURRENT PATHOLOGICAL FRACTURE, VERTEBRA(E), INITIAL ENCOUNTER FOR FRACTURE: ICD-10-CM

## 2021-08-10 ENCOUNTER — HOSPITAL ENCOUNTER (OUTPATIENT)
Dept: RADIOLOGY | Facility: HOSPITAL | Age: 66
Discharge: HOME | End: 2021-08-10
Attending: OBSTETRICS & GYNECOLOGY
Payer: MEDICARE

## 2021-08-10 DIAGNOSIS — M80.08XA AGE-RELATED OSTEOPOROSIS WITH CURRENT PATHOLOGICAL FRACTURE, VERTEBRA(E), INITIAL ENCOUNTER FOR FRACTURE: ICD-10-CM

## 2021-08-10 DIAGNOSIS — M85.80 OSTEOPENIA AFTER MENOPAUSE: ICD-10-CM

## 2021-08-10 DIAGNOSIS — Z78.0 OSTEOPENIA AFTER MENOPAUSE: ICD-10-CM

## 2021-08-10 PROCEDURE — 77080 DXA BONE DENSITY AXIAL: CPT | Mod: 26,,, | Performed by: RADIOLOGY

## 2021-08-10 PROCEDURE — 77080 DXA BONE DENSITY AXIAL: CPT | Mod: TC,PO

## 2021-08-10 PROCEDURE — 77080 DEXA BONE DENSITY SPINE HIP: ICD-10-PCS | Mod: 26,,, | Performed by: RADIOLOGY

## 2021-08-11 ENCOUNTER — OFFICE VISIT (OUTPATIENT)
Dept: OBSTETRICS AND GYNECOLOGY | Facility: CLINIC | Age: 66
End: 2021-08-11
Payer: MEDICARE

## 2021-08-11 VITALS — WEIGHT: 143.5 LBS | BODY MASS INDEX: 26.25 KG/M2

## 2021-08-11 DIAGNOSIS — Z01.419 WELL WOMAN EXAM WITH ROUTINE GYNECOLOGICAL EXAM: Primary | ICD-10-CM

## 2021-08-11 PROCEDURE — G0101 CA SCREEN;PELVIC/BREAST EXAM: HCPCS | Mod: S$PBB,GZ,, | Performed by: OBSTETRICS & GYNECOLOGY

## 2021-08-11 PROCEDURE — 88175 CYTOPATH C/V AUTO FLUID REDO: CPT | Performed by: OBSTETRICS & GYNECOLOGY

## 2021-08-11 PROCEDURE — 99213 OFFICE O/P EST LOW 20 MIN: CPT | Mod: PBBFAC,PO | Performed by: OBSTETRICS & GYNECOLOGY

## 2021-08-11 PROCEDURE — 99999 PR PBB SHADOW E&M-EST. PATIENT-LVL III: ICD-10-PCS | Mod: PBBFAC,,, | Performed by: OBSTETRICS & GYNECOLOGY

## 2021-08-11 PROCEDURE — 99999 PR PBB SHADOW E&M-EST. PATIENT-LVL III: CPT | Mod: PBBFAC,,, | Performed by: OBSTETRICS & GYNECOLOGY

## 2021-08-11 PROCEDURE — G0101 PR CA SCREEN;PELVIC/BREAST EXAM: ICD-10-PCS | Mod: S$PBB,GZ,, | Performed by: OBSTETRICS & GYNECOLOGY

## 2021-08-24 DIAGNOSIS — M81.0 OSTEOPOROSIS, UNSPECIFIED OSTEOPOROSIS TYPE, UNSPECIFIED PATHOLOGICAL FRACTURE PRESENCE: ICD-10-CM

## 2021-08-24 RX ORDER — DENOSUMAB 60 MG/ML
60 INJECTION SUBCUTANEOUS
Qty: 1 ML | Refills: 1 | OUTPATIENT
Start: 2021-08-24

## 2021-12-13 DIAGNOSIS — K21.9 GASTROESOPHAGEAL REFLUX DISEASE, UNSPECIFIED WHETHER ESOPHAGITIS PRESENT: ICD-10-CM

## 2021-12-13 DIAGNOSIS — R10.13 POSTPRANDIAL EPIGASTRIC PAIN: Primary | ICD-10-CM

## 2021-12-18 ENCOUNTER — IMMUNIZATION (OUTPATIENT)
Dept: FAMILY MEDICINE | Facility: CLINIC | Age: 66
End: 2021-12-18
Payer: MEDICARE

## 2021-12-18 DIAGNOSIS — Z23 NEED FOR VACCINATION: Primary | ICD-10-CM

## 2021-12-18 PROCEDURE — 0004A COVID-19, MRNA, LNP-S, PF, 30 MCG/0.3 ML DOSE VACCINE: CPT | Mod: PBBFAC,PO

## 2022-01-07 ENCOUNTER — PATIENT MESSAGE (OUTPATIENT)
Dept: ENDOSCOPY | Facility: HOSPITAL | Age: 67
End: 2022-01-07
Payer: MEDICARE

## 2022-01-07 ENCOUNTER — PATIENT MESSAGE (OUTPATIENT)
Dept: GASTROENTEROLOGY | Facility: CLINIC | Age: 67
End: 2022-01-07
Payer: MEDICARE

## 2022-01-10 ENCOUNTER — PATIENT MESSAGE (OUTPATIENT)
Dept: GASTROENTEROLOGY | Facility: CLINIC | Age: 67
End: 2022-01-10
Payer: MEDICARE

## 2022-01-27 ENCOUNTER — PATIENT MESSAGE (OUTPATIENT)
Dept: GASTROENTEROLOGY | Facility: CLINIC | Age: 67
End: 2022-01-27
Payer: MEDICARE

## 2022-01-27 ENCOUNTER — PATIENT MESSAGE (OUTPATIENT)
Dept: ORTHOPEDICS | Facility: CLINIC | Age: 67
End: 2022-01-27
Payer: MEDICARE

## 2022-01-27 ENCOUNTER — TELEPHONE (OUTPATIENT)
Dept: GASTROENTEROLOGY | Facility: CLINIC | Age: 67
End: 2022-01-27
Payer: MEDICARE

## 2022-01-27 ENCOUNTER — PATIENT MESSAGE (OUTPATIENT)
Dept: OBSTETRICS AND GYNECOLOGY | Facility: CLINIC | Age: 67
End: 2022-01-27
Payer: MEDICARE

## 2022-01-27 DIAGNOSIS — Z12.39 ENCOUNTER FOR SCREENING FOR MALIGNANT NEOPLASM OF BREAST, UNSPECIFIED SCREENING MODALITY: Primary | ICD-10-CM

## 2022-01-27 DIAGNOSIS — Z12.31 ENCOUNTER FOR SCREENING MAMMOGRAM FOR MALIGNANT NEOPLASM OF BREAST: ICD-10-CM

## 2022-01-27 NOTE — TELEPHONE ENCOUNTER
----- Message from Jesús Daley MD sent at 1/27/2022  9:57 AM CST -----  Is there person closer to her home that you can schedule her with  ----- Message -----  From: Mindy B Seipel, RN  Sent: 1/27/2022   8:26 AM CST  To: Jesús Daley MD, Edi BRANNON Staff    Pt is wondering if she could possibly get her EGD & Colonoscopy done somewhere closer to her home in [place].  She has a crazy work schedule and that would be much easier for her.  Please advise.  Thanks

## 2022-01-27 NOTE — TELEPHONE ENCOUNTER
Return call to patient. No answer left message for patient to call the office back.   Sent a message to the patient portal.

## 2022-01-28 ENCOUNTER — PATIENT MESSAGE (OUTPATIENT)
Dept: ORTHOPEDICS | Facility: CLINIC | Age: 67
End: 2022-01-28
Payer: MEDICARE

## 2022-02-09 DIAGNOSIS — M25.562 LEFT KNEE PAIN, UNSPECIFIED CHRONICITY: Primary | ICD-10-CM

## 2022-02-10 ENCOUNTER — HOSPITAL ENCOUNTER (OUTPATIENT)
Dept: RADIOLOGY | Facility: HOSPITAL | Age: 67
Discharge: HOME | End: 2022-02-10
Attending: OBSTETRICS & GYNECOLOGY
Payer: MEDICARE

## 2022-02-10 VITALS — BODY MASS INDEX: 26.17 KG/M2 | HEIGHT: 62 IN | WEIGHT: 142.19 LBS

## 2022-02-10 DIAGNOSIS — Z12.31 ENCOUNTER FOR SCREENING MAMMOGRAM FOR MALIGNANT NEOPLASM OF BREAST: ICD-10-CM

## 2022-02-10 DIAGNOSIS — Z12.39 ENCOUNTER FOR SCREENING FOR MALIGNANT NEOPLASM OF BREAST, UNSPECIFIED SCREENING MODALITY: ICD-10-CM

## 2022-02-10 PROCEDURE — 77063 MAMMO DIGITAL SCREENING BILAT WITH TOMO: ICD-10-PCS | Mod: 26,,, | Performed by: RADIOLOGY

## 2022-02-10 PROCEDURE — 77067 MAMMO DIGITAL SCREENING BILAT WITH TOMO: ICD-10-PCS | Mod: 26,,, | Performed by: RADIOLOGY

## 2022-02-10 PROCEDURE — 77067 SCR MAMMO BI INCL CAD: CPT | Mod: TC,PO

## 2022-02-10 PROCEDURE — 77063 BREAST TOMOSYNTHESIS BI: CPT | Mod: 26,,, | Performed by: RADIOLOGY

## 2022-02-10 PROCEDURE — 77067 SCR MAMMO BI INCL CAD: CPT | Mod: 26,,, | Performed by: RADIOLOGY

## 2022-02-11 ENCOUNTER — HOSPITAL ENCOUNTER (OUTPATIENT)
Dept: RADIOLOGY | Facility: HOSPITAL | Age: 67
Discharge: HOME | End: 2022-02-11
Attending: ORTHOPAEDIC SURGERY
Payer: MEDICARE

## 2022-02-11 ENCOUNTER — OFFICE VISIT (OUTPATIENT)
Dept: ORTHOPEDICS | Facility: CLINIC | Age: 67
End: 2022-02-11
Payer: MEDICARE

## 2022-02-11 VITALS — HEIGHT: 62 IN | WEIGHT: 142 LBS | BODY MASS INDEX: 26.13 KG/M2

## 2022-02-11 DIAGNOSIS — M25.562 LEFT KNEE PAIN, UNSPECIFIED CHRONICITY: ICD-10-CM

## 2022-02-11 DIAGNOSIS — M25.562 LEFT KNEE PAIN, UNSPECIFIED CHRONICITY: Primary | ICD-10-CM

## 2022-02-11 PROCEDURE — 99203 OFFICE O/P NEW LOW 30 MIN: CPT | Mod: S$PBB,,, | Performed by: ORTHOPAEDIC SURGERY

## 2022-02-11 PROCEDURE — 73560 XR KNEE ORTHO LEFT: ICD-10-PCS | Mod: 26,RT,, | Performed by: RADIOLOGY

## 2022-02-11 PROCEDURE — 99203 PR OFFICE/OUTPT VISIT, NEW, LEVL III, 30-44 MIN: ICD-10-PCS | Mod: S$PBB,,, | Performed by: ORTHOPAEDIC SURGERY

## 2022-02-11 PROCEDURE — 99213 OFFICE O/P EST LOW 20 MIN: CPT | Mod: PBBFAC,PN | Performed by: ORTHOPAEDIC SURGERY

## 2022-02-11 PROCEDURE — 73560 X-RAY EXAM OF KNEE 1 OR 2: CPT | Mod: TC,PO,RT,59

## 2022-02-11 PROCEDURE — 99999 PR PBB SHADOW E&M-EST. PATIENT-LVL III: ICD-10-PCS | Mod: PBBFAC,,, | Performed by: ORTHOPAEDIC SURGERY

## 2022-02-11 PROCEDURE — 73562 XR KNEE ORTHO LEFT: ICD-10-PCS | Mod: 26,LT,, | Performed by: RADIOLOGY

## 2022-02-11 PROCEDURE — 99999 PR PBB SHADOW E&M-EST. PATIENT-LVL III: CPT | Mod: PBBFAC,,, | Performed by: ORTHOPAEDIC SURGERY

## 2022-02-11 PROCEDURE — 73562 X-RAY EXAM OF KNEE 3: CPT | Mod: 26,LT,, | Performed by: RADIOLOGY

## 2022-02-11 PROCEDURE — 73560 X-RAY EXAM OF KNEE 1 OR 2: CPT | Mod: 26,RT,, | Performed by: RADIOLOGY

## 2022-02-11 RX ORDER — VALACYCLOVIR HYDROCHLORIDE 1 G/1
TABLET, FILM COATED ORAL
COMMUNITY
Start: 2021-12-11 | End: 2023-11-06 | Stop reason: SDUPTHER

## 2022-02-11 NOTE — PROGRESS NOTES
"  66 years old comes in today I have a bump on her left knee checked which she has noticed now for a few years time feels that it might have gotten bigger does not really report much pain associated with it    Exam shows a small 4 mm in diameter subcutaneous nodule in the region the tibial tubercle that appears benign, extensor mechanism intact no instability about the knee    X-rays of the knee are negative    Assessment:  Benign-appearing soft tissue mass left knee    Plan:  Observation reassurance, follow-up as needed    Imaging studies ordered and reviewed by me    Further History  Aching pain  Worse with activity  Relieved with rest  No other associated symptoms  No other radiation    Further Exam  Alert and oriented  Pleasant  Contralateral limb has appropriate range of motion for age and condition  Contralateral limb has appropriate strength for age and condition  Contralateral limb has appropriate stability  for age and condition  No adenopathy  Pulses are appropriate for current condition  Skin is intact        Chief Complaint    Chief Complaint   Patient presents with    Left Hip - Pain    Left Knee - Cyst       HPI  Kem Rousseau is a 66 y.o.  female who presents with       Past Medical History  Past Medical History:   Diagnosis Date    Anxiety disorder     Exophthalmos 2004    General anesthetics causing adverse effect in therapeutic use     "slow to wake up"    GERD (gastroesophageal reflux disease) 2005    Irritable colon 2003    Menopause 10/03/2006    Migraine     Osteopenia 10/26/2006    Osteopenia     PONV (postoperative nausea and vomiting)     Postprocedural hypothyroidism 2000    Pure hypercholesterolemia 2003    Snoring 2015    Syncope and collapse 2015       Past Surgical History  Past Surgical History:   Procedure Laterality Date     SECTION      x5    CHOLECYSTECTOMY      COLONOSCOPY  2018    Dr. Osborne    " EXCISION OF MASS OF EXTREMITY Right 8/12/2020    Procedure: EXCISION, MASS, EXTREMITY;  Surgeon: Xavier Duque MD;  Location: [facility];  Service: Orthopedics;  Laterality: Right;    foot biopsy      2x    THYROIDECTOMY      due to goiter        Medications  Current Outpatient Medications   Medication Sig    estradioL (IMVEXXY MAINTENANCE PACK) 10 mcg Inst Place 1 Dose vaginally twice a week.    levothyroxine (SYNTHROID) 88 MCG tablet Take 1 tablet (88 mcg total) by mouth before breakfast.    ondansetron (ZOFRAN-ODT) 4 MG TbDL Take 2 tablets (8 mg total) by mouth every 8 (eight) hours as needed.    pantoprazole (PROTONIX) 40 MG tablet Take 1 tablet (40 mg total) by mouth once daily.    rosuvastatin (CRESTOR) 5 MG tablet TAKE 1 TABLET EVERY DAY    sumatriptan (IMITREX) 50 MG tablet TAKE 1 TABLET(50 MG) BY MOUTH 1 TIME FOR 1 DOSE    testosterone (NATESTO) 5.5 mg/0.122 gram/actuation GlPm 1 Dose by Nasal route 3 (three) times a week.    valACYclovir (VALTREX) 1000 MG tablet as needed.     No current facility-administered medications for this visit.       Allergies  Review of patient's allergies indicates:   Allergen Reactions    Lipitor [atorvastatin] Other (See Comments)     Makes her emotionally sensitive.    Codeine Other (See Comments)     vomiting    [place]        Family History  Family History   Problem Relation Age of Onset    Alzheimer's disease Mother     Heart disease Mother     Lung cancer Father 44    Cancer Father     COPD Sister     Lung cancer Sister 56    Celiac disease Neg Hx     Colon cancer Neg Hx     Colon polyps Neg Hx     Crohn's disease Neg Hx     Cystic fibrosis Neg Hx     Esophageal cancer Neg Hx     Inflammatory bowel disease Neg Hx     Liver cancer Neg Hx     Liver disease Neg Hx     Rectal cancer Neg Hx     Stomach cancer Neg Hx     Ulcerative colitis Neg Hx     Hemochromatosis Neg Hx     Cirrhosis Neg Hx     Lymphoma Neg Hx     Tuberculosis Neg Hx      Pancreatic cancer Neg Hx     Uterine cancer Neg Hx     Ovarian cancer Neg Hx     Kidney cancer Neg Hx     Bladder Cancer Neg Hx        Social History  Social History     Socioeconomic History    Marital status:    Tobacco Use    Smoking status: Former Smoker     Packs/day: 0.33     Years: 10.00     Pack years: 3.30     Types: Cigarettes     Quit date: 1988     Years since quittin.1    Smokeless tobacco: Never Used   Substance and Sexual Activity    Alcohol use: Yes     Comment: very little    Drug use: Never    Sexual activity: Yes     Partners: Male     Birth control/protection: Post-menopausal, None     Comment:  since    Social History Narrative    Works     On second Marriage since     First marriage lasted 10 years        4 children:  (parkinson's dz dxed age 26), , ,  Healthy     Social Determinants of Health     Financial Resource Strain: Low Risk     Difficulty of Paying Living Expenses: Not hard at all   Food Insecurity: No Food Insecurity    Worried About Running Out of Food in the Last Year: Never true    Ran Out of Food in the Last Year: Never true   Transportation Needs: No Transportation Needs    Lack of Transportation (Medical): No    Lack of Transportation (Non-Medical): No   Physical Activity: Sufficiently Active    Days of Exercise per Week: 5 days    Minutes of Exercise per Session: 60 min   Stress: No Stress Concern Present    Feeling of Stress : Not at all   Social Connections: Unknown    Frequency of Communication with Friends and Family: More than three times a week    Frequency of Social Gatherings with Friends and Family: Once a week    Active Member of Clubs or Organizations: No    Attends Club or Organization Meetings: 1 to 4 times per year    Marital Status:    Housing Stability: Low Risk     Unable to Pay for Housing in the Last Year: No    Number of Places Lived in the Last Year: 1     Unstable Housing in the Last Year: No               Review of Systems     Constitutional: Negative    HENT: Negative  Eyes: Negative  Respiratory: Negative  Cardiovascular: Negative  Musculoskeletal: HPI  Skin: Negative  Neurological: Negative  Hematological: Negative  Endocrine: Negative                 Physical Exam    There were no vitals filed for this visit.  Body mass index is 25.96 kg/m².  Physical Examination:     General appearance -  well appearing, and in no distress  Mental status - awake  Neck - supple  Chest -  symmetric air entry  Heart - normal rate   Abdomen - soft      Assessment     1. Left knee pain, unspecified chronicity          Plan

## 2022-07-01 PROBLEM — E03.9 HYPOTHYROIDISM: Status: ACTIVE | Noted: 2022-07-01

## 2022-07-07 PROBLEM — S92.101D: Status: ACTIVE | Noted: 2022-07-07

## 2022-07-07 PROBLEM — S22.32XD CLOSED FRACTURE OF ONE RIB OF LEFT SIDE WITH ROUTINE HEALING: Status: ACTIVE | Noted: 2022-07-07

## 2022-07-07 PROBLEM — R91.1 PULMONARY NODULE: Status: ACTIVE | Noted: 2022-07-07

## 2022-07-07 PROBLEM — S32.050D COMPRESSION FRACTURE OF L5 VERTEBRA WITH ROUTINE HEALING: Status: ACTIVE | Noted: 2022-07-07

## 2022-07-08 ENCOUNTER — TELEPHONE (OUTPATIENT)
Dept: NEUROSURGERY | Facility: CLINIC | Age: 67
End: 2022-07-08
Payer: MEDICARE
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
## 2022-07-08 NOTE — TELEPHONE ENCOUNTER
Called patient to schedule NP appointment with Katharina Cole per referral from Ena Patterson NP.  No answer.  Left voicemail.

## 2022-07-21 ENCOUNTER — CLINICAL SUPPORT (OUTPATIENT)
Dept: REHABILITATION | Facility: HOSPITAL | Age: 67
End: 2022-07-21
Payer: MEDICARE

## 2022-07-21 DIAGNOSIS — Z74.09 IMPAIRED FUNCTIONAL MOBILITY, BALANCE, GAIT, AND ENDURANCE: ICD-10-CM

## 2022-07-21 DIAGNOSIS — M25.571 ACUTE RIGHT ANKLE PAIN: ICD-10-CM

## 2022-07-21 DIAGNOSIS — S92.124A: ICD-10-CM

## 2022-07-21 PROCEDURE — 97161 PT EVAL LOW COMPLEX 20 MIN: CPT | Mod: PN | Performed by: PHYSICAL THERAPIST

## 2022-07-21 PROCEDURE — 97110 THERAPEUTIC EXERCISES: CPT | Mod: PN | Performed by: PHYSICAL THERAPIST
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
## 2022-07-25 ENCOUNTER — CLINICAL SUPPORT (OUTPATIENT)
Dept: REHABILITATION | Facility: HOSPITAL | Age: 67
End: 2022-07-25
Payer: MEDICARE

## 2022-07-25 DIAGNOSIS — M25.571 ACUTE RIGHT ANKLE PAIN: Primary | ICD-10-CM

## 2022-07-25 DIAGNOSIS — Z74.09 IMPAIRED FUNCTIONAL MOBILITY, BALANCE, GAIT, AND ENDURANCE: ICD-10-CM

## 2022-07-25 PROCEDURE — 97112 NEUROMUSCULAR REEDUCATION: CPT | Mod: PN | Performed by: PHYSICAL THERAPIST

## 2022-07-25 PROCEDURE — 97110 THERAPEUTIC EXERCISES: CPT | Mod: PN | Performed by: PHYSICAL THERAPIST

## 2022-07-25 PROCEDURE — 97140 MANUAL THERAPY 1/> REGIONS: CPT | Mod: PN | Performed by: PHYSICAL THERAPIST

## 2022-07-25 NOTE — PROGRESS NOTES
"  Physical Therapy Daily Treatment Note     Name: Kem Rousseau  Clinic Number: [id number]    Therapy Diagnosis:   Encounter Diagnoses   Name Primary?    Acute right ankle pain Yes    Impaired functional mobility, balance, gait, and endurance      Physician: Rohith Arredondo MD    Visit Date: 7/25/2022  Physician Orders: PT Eval and Treat   Medical Diagnosis from Referral: Non displaced fracture of body of right talus  Evaluation Date: 7/21/2022  Authorization Period Expiration: 12/31/22  Plan of Care Expiration: 09/15/22  Progress Note Due: 08/21/22 or 10th visit  Visit # / Visits authorized: 1/ 20   FOTO: 1/ 3       Time In: 2:21 PM  Time Out: 3:00 PM   Total Billable Time: 39 minutes     Precautions: Standard    Subjective     Pt reports: That her foot is sore. I have been doing my exercises.  She was compliant with home exercise program.  Response to previous treatment: Initial evaluation   Functional change: Ongoing     Pain: 0/10  Location: right ankle    Objective     Kem received therapeutic exercises to develop strength and ROM for 20 minutes including:  Gastroc stretch w strap 4 x 30"   Soleues stretch w strap 4 x 30"   Seated heel raises 3 x 10   4 way ankle 2 x 10 yellow theraband     Kem received the following manual therapy techniques: Joint mobilizations and PROM were applied to the: right ankle for 10 minutes, including:  Ankle PROM in all planes  Grade II/II medial/lateral calcaneal rocking     Kem participated in neuromuscular re-education activities to improve: Balance, Kinesthetic, Sense and Proprioception for 8 minutes. The following activities were included:  BAPS board CW/CCW 2 min ea   Weight shifts A/P, M/L 2 min ea     Kem received cold pack for  minutes to to decrease circulation, pain, and swelling.    Home Exercises Provided and Patient Education Provided     Education provided:   - Transition to weightbearing   - HEP     Written Home Exercises Provided: Patient instructed to " cont prior HEP.  Exercises were reviewed and Kem was able to demonstrate them prior to the end of the session.  Kem demonstrated good  understanding of the education provided.     See EMR under Patient Instructions for exercises provided 7/25/2022.    Assessment     Difficulty performing inversion/eversion during 4 way ankle. Minimal pain during weight shifts activities. She will be ready to begin transitioning out of her boot  Kem Is progressing well towards her goals.   Pt prognosis is Excellent.     Pt will continue to benefit from skilled outpatient physical therapy to address the deficits listed in the problem list box on initial evaluation, provide pt/family education and to maximize pt's level of independence in the home and community environment.     Pt's spiritual, cultural and educational needs considered and pt agreeable to plan of care and goals.    Anticipated barriers to physical therapy: Unable to drive     Goals:     Short Term Goals: 4 weeks   1) Pt will be I with established HEP - met 07/25/22  2) Edema will decrease by 50% - progressing, not met   3) Pt will have right ankle ROM within 25% of left to assist in normalizing gait mechanics - progressing, not met   4) Pt will walk community distances without wearing a walking boot - progressing ,not met      Long Term Goals: 8 weeks   1) Strength will be 5/5 in all muscle groups - progressing, not met   2) R ankle ROM will be equal to left - progressing, not met   3) Pt will walk community distances on all surfaces with normal gait mechanics - progressing, not met          Plan     Continue progress of exercises with increased closed chain activities     Eric Duncan, PT

## 2022-08-01 ENCOUNTER — CLINICAL SUPPORT (OUTPATIENT)
Dept: REHABILITATION | Facility: HOSPITAL | Age: 67
End: 2022-08-01
Payer: MEDICARE

## 2022-08-01 DIAGNOSIS — Z74.09 IMPAIRED FUNCTIONAL MOBILITY, BALANCE, GAIT, AND ENDURANCE: ICD-10-CM

## 2022-08-01 DIAGNOSIS — M25.571 ACUTE RIGHT ANKLE PAIN: Primary | ICD-10-CM

## 2022-08-01 PROCEDURE — 97112 NEUROMUSCULAR REEDUCATION: CPT | Mod: PN | Performed by: PHYSICAL THERAPIST

## 2022-08-01 PROCEDURE — 97110 THERAPEUTIC EXERCISES: CPT | Mod: PN | Performed by: PHYSICAL THERAPIST

## 2022-08-01 NOTE — PROGRESS NOTES
"  Physical Therapy Daily Treatment Note     Name: Kem Rousseau  Clinic Number: [id number]    Therapy Diagnosis:   Encounter Diagnoses   Name Primary?    Acute right ankle pain Yes    Impaired functional mobility, balance, gait, and endurance      Physician: Rohith Arredondo MD    Visit Date: 8/1/2022  Physician Orders: PT Eval and Treat   Medical Diagnosis from Referral: Non displaced fracture of body of right talus  Evaluation Date: 7/21/2022  Authorization Period Expiration: 12/31/22  Plan of Care Expiration: 09/15/22  Progress Note Due: 08/21/22 or 10th visit  Visit # / Visits authorized: 2/ 20   FOTO: 1/ 3       Time In: 3:54 PM  Time Out: 4:25 PM   Total Billable Time: 30 minutes     Precautions: Standard    Subjective     Pt reports: That she has been walking around more without her boot on.   She was compliant with home exercise program.  Response to previous treatment: No adverse response   Functional change: Intermittently walking with a shoe      Pain: 0/10  Location: right ankle    Objective     Kem received therapeutic exercises to develop strength and ROM for 22 minutes including:  Standing lunge stretch 2 min 5 sec hold   Gastroc stretch w strap 4 x 30"   Soleues stretch w strap 4 x 30"   Seated heel raises 3 x 15 7.5#  4 way ankle 2 x 10 yellow theraband     Kem received the following manual therapy techniques: Joint mobilizations and PROM were applied to the: right ankle for  minutes, including:  Ankle PROM in all planes  Grade II/II medial/lateral calcaneal rocking     Kem participated in neuromuscular re-education activities to improve: Balance, Kinesthetic, Sense and Proprioception for 8 minutes. The following activities were included:  BAPS board CW/CCW 2 min ea   Tandem stance 4 x 30" ea     Kem received cold pack for  minutes to to decrease circulation, pain, and swelling.    Home Exercises Provided and Patient Education Provided     Education provided:   - Transition to weightbearing "   - HEP     Written Home Exercises Provided: Patient instructed to cont prior HEP.  Exercises were reviewed and Kem was able to demonstrate them prior to the end of the session.  Kem demonstrated good  understanding of the education provided.     See EMR under Patient Instructions for exercises provided 7/25/2022.    Assessment     Able to perform new balance activities with some difficulty. She will be ready for progression of weightbearing activities next visit     Kem Is progressing well towards her goals.   Pt prognosis is Excellent.     Pt will continue to benefit from skilled outpatient physical therapy to address the deficits listed in the problem list box on initial evaluation, provide pt/family education and to maximize pt's level of independence in the home and community environment.     Pt's spiritual, cultural and educational needs considered and pt agreeable to plan of care and goals.    Anticipated barriers to physical therapy: Unable to drive     Goals:     Short Term Goals: 4 weeks   1) Pt will be I with established HEP - met 07/25/22  2) Edema will decrease by 50% - progressing, not met   3) Pt will have right ankle ROM within 25% of left to assist in normalizing gait mechanics - progressing, not met   4) Pt will walk community distances without wearing a walking boot - progressing ,not met      Long Term Goals: 8 weeks   1) Strength will be 5/5 in all muscle groups - progressing, not met   2) R ankle ROM will be equal to left - progressing, not met   3) Pt will walk community distances on all surfaces with normal gait mechanics - progressing, not met          Plan     Continue progress of exercises with increased closed chain activities     Eric Duncan, PT

## 2022-08-03 DIAGNOSIS — R10.13 EPIGASTRIC PAIN: ICD-10-CM

## 2022-08-03 DIAGNOSIS — K21.9 GASTROESOPHAGEAL REFLUX DISEASE, UNSPECIFIED WHETHER ESOPHAGITIS PRESENT: Primary | ICD-10-CM

## 2022-08-03 NOTE — PROGRESS NOTES
"  Physical Therapy Daily Treatment Note     Name: Kem Rousseau  Clinic Number: [id number]    Therapy Diagnosis:   Encounter Diagnoses   Name Primary?    Acute right ankle pain Yes    Impaired functional mobility, balance, gait, and endurance      Physician: Rohith Arredondo MD    Visit Date: 8/4/2022  Physician Orders: PT Eval and Treat   Medical Diagnosis from Referral: Non displaced fracture of body of right talus  Evaluation Date: 7/21/2022  Authorization Period Expiration: 12/31/22  Plan of Care Expiration: 09/15/22  Progress Note Due: 08/21/22 or 10th visit  Visit # / Visits authorized: 3/20   FOTO: 1/ 3   PTA Visit #: 1/5    Time In: 220 PM  Time Out: 300 PM   Total Billable Time: 40 minutes     Precautions: Standard    Subjective     Pt reports: she hasn't had the boot since yesterday and its been fine with some swelling. She is seeing a neurologist about her back on Tuesday.    She was compliant with home exercise program.  Response to previous treatment: No adverse response   Functional change: Intermittently walking with a shoe      Pain: 0/10  Location: right ankle     Objective     Kem received therapeutic exercises to develop strength and ROM for 32 minutes including:    Standing lunge stretch 2 min 5 sec hold   Gastroc stretch w strap 4 x 30"   Soleus stretch w strap 4 x 30"   4 way ankle 2 x 10 yellow theraband   Seated heel raises 3 x 15 7.5#  Bilateral shuttle 1 bands 2 x10 - feet higher on platform to prevent tension on back    Kem received the following manual therapy techniques: Joint mobilizations and PROM were applied to the: right ankle for 00 minutes, including:    Ankle PROM in all planes  Grade II/II medial/lateral calcaneal rocking     Kem participated in neuromuscular re-education activities to improve: Balance, Kinesthetic, Sense and Proprioception for 8 minutes. The following activities were included:    BAPS board CW/CCW 2 min ea   Tandem stance 4 x 30" ea     Kem received " cold pack for 00 minutes to to decrease circulation, pain, and swelling.    Home Exercises Provided and Patient Education Provided     Education provided:   - Transition to weightbearing   - HEP     Written Home Exercises Provided: Patient instructed to cont prior HEP.  Exercises were reviewed and Kem was able to demonstrate them prior to the end of the session. Kem demonstrated good  understanding of the education provided.     See EMR under Patient Instructions for exercises provided 7/25/2022.    Assessment     Patient presented to therapy this date without boot donned, slightly antalgic gait noted. She did well with all therex including addition of shuttle. Patient was slightly unstable with tandem balance but was able to maintain well with minimal use of upper extremities to maintain balance. She had some tension reported in her low back at end of session, will monitor response to today's visit and progress as able.    Kem Is progressing well towards her goals.   Pt prognosis is Excellent.     Pt will continue to benefit from skilled outpatient physical therapy to address the deficits listed in the problem list box on initial evaluation, provide pt/family education and to maximize pt's level of independence in the home and community environment.     Pt's spiritual, cultural and educational needs considered and pt agreeable to plan of care and goals.    Anticipated barriers to physical therapy: Unable to drive     Goals:  Short Term Goals: 4 weeks   1) Pt will be I with established HEP - met 07/25/22  2) Edema will decrease by 50% - progressing, not met   3) Pt will have right ankle ROM within 25% of left to assist in normalizing gait mechanics - progressing, not met   4) Pt will walk community distances without wearing a walking boot - progressing ,not met      Long Term Goals: 8 weeks   1) Strength will be 5/5 in all muscle groups - progressing, not met   2) R ankle ROM will be equal to left - progressing,  not met   3) Pt will walk community distances on all surfaces with normal gait mechanics - progressing, not met      Plan     Continue progress of exercises with increased closed chain activities     Ponce Johnson, PTA

## 2022-08-04 ENCOUNTER — CLINICAL SUPPORT (OUTPATIENT)
Dept: REHABILITATION | Facility: HOSPITAL | Age: 67
End: 2022-08-04
Payer: MEDICARE

## 2022-08-04 DIAGNOSIS — Z74.09 IMPAIRED FUNCTIONAL MOBILITY, BALANCE, GAIT, AND ENDURANCE: ICD-10-CM

## 2022-08-04 DIAGNOSIS — M25.571 ACUTE RIGHT ANKLE PAIN: Primary | ICD-10-CM

## 2022-08-04 PROCEDURE — 97110 THERAPEUTIC EXERCISES: CPT | Mod: PN,CQ

## 2022-08-04 PROCEDURE — 97112 NEUROMUSCULAR REEDUCATION: CPT | Mod: PN,CQ

## 2022-08-08 ENCOUNTER — CLINICAL SUPPORT (OUTPATIENT)
Dept: REHABILITATION | Facility: HOSPITAL | Age: 67
End: 2022-08-08
Payer: MEDICARE

## 2022-08-08 DIAGNOSIS — Z74.09 IMPAIRED FUNCTIONAL MOBILITY, BALANCE, GAIT, AND ENDURANCE: ICD-10-CM

## 2022-08-08 DIAGNOSIS — M25.571 RIGHT ANKLE PAIN, UNSPECIFIED CHRONICITY: Primary | ICD-10-CM

## 2022-08-08 PROCEDURE — 97112 NEUROMUSCULAR REEDUCATION: CPT | Mod: PN

## 2022-08-08 PROCEDURE — 97110 THERAPEUTIC EXERCISES: CPT | Mod: PN

## 2022-08-08 NOTE — PROGRESS NOTES
"  Physical Therapy Daily Treatment Note     Name: Kem Rousseau  Clinic Number: [id number]    Therapy Diagnosis:   Encounter Diagnoses   Name Primary?    Right ankle pain, unspecified chronicity Yes    Impaired functional mobility, balance, gait, and endurance      Physician: Rohith Arredondo MD    Visit Date: 8/8/2022  Physician Orders: PT Eval and Treat   Medical Diagnosis from Referral: Non displaced fracture of body of right talus  Evaluation Date: 7/21/2022  Authorization Period Expiration: 12/31/22  Plan of Care Expiration: 09/15/22  Progress Note Due: 08/21/22 or 10th visit  Visit # / Visits authorized: 4/20   FOTO: 1/ 3   PTA Visit #: 0/5    Time In: 0430 PM  Time Out: 0510 PM   Total Billable Time: 40 minutes     Precautions: Standard    Subjective     Pt reports: she sees the neurologist tomorrow to see how the back is healing. The ankle is progressing well as intended. She states she has to spend 3 more weeks in the left arm brace as it heals. She had an increase in soreness after the shuttle activities last visit.     She was compliant with home exercise program.  Response to previous treatment: No adverse response   Functional change: Intermittently walking with a shoe      Pain: 2/10, 4/10   Location: right ankle, low back     Objective     Kem received therapeutic exercises to develop strength and ROM for 32 minutes including:    Recumbent bike 5 min lvl 1   Standing lunge stretch 2 min 5 sec hold   Gastroc stretch w strap 4 x 30"   Soleus stretch w strap 4 x 30"   4 way ankle 2 x 12 yellow theraband   Seated heel raises 4 x 15 7.5#      Kem received the following manual therapy techniques: Joint mobilizations and PROM were applied to the: right ankle for 00 minutes, including:    Ankle PROM in all planes  Grade II/II medial/lateral calcaneal rocking       Kem participated in neuromuscular re-education activities to improve: Balance, Kinesthetic, Sense and Proprioception for 8 minutes. The " "following activities were included:    BAPS board CW/CCW 2 min ea   Tandem stance 4 x 30" ea     Kem received cold pack for 00 minutes to to decrease circulation, pain, and swelling.    Home Exercises Provided and Patient Education Provided     Education provided:   - Transition to weightbearing   - HEP     Written Home Exercises Provided: Patient instructed to cont prior HEP.  Exercises were reviewed and Kem was able to demonstrate them prior to the end of the session. Kem demonstrated good  understanding of the education provided.     See EMR under Patient Instructions for exercises provided 7/25/2022.    Assessment     Pt presents to PT without boot and min swelling in right ankle. She notes a positive response after recumbent bike and stretching activities. She does well with all strengthening activities with little to no cueing required and no pain. She would benefit from continued progression at this time.     Kem Is progressing well towards her goals.   Pt prognosis is Excellent.     Pt will continue to benefit from skilled outpatient physical therapy to address the deficits listed in the problem list box on initial evaluation, provide pt/family education and to maximize pt's level of independence in the home and community environment.     Pt's spiritual, cultural and educational needs considered and pt agreeable to plan of care and goals.    Anticipated barriers to physical therapy: Unable to drive     Goals:  Short Term Goals: 4 weeks   1) Pt will be I with established HEP - met 07/25/22  2) Edema will decrease by 50% - progressing, not met   3) Pt will have right ankle ROM within 25% of left to assist in normalizing gait mechanics - progressing, not met   4) Pt will walk community distances without wearing a walking boot - progressing ,not met      Long Term Goals: 8 weeks   1) Strength will be 5/5 in all muscle groups - progressing, not met   2) R ankle ROM will be equal to left - progressing, not met "   3) Pt will walk community distances on all surfaces with normal gait mechanics - progressing, not met      Plan     Continue progress of exercises with increased closed chain activities     Roger Rushing, PT

## 2022-08-10 NOTE — PROGRESS NOTES
"  Physical Therapy Daily Treatment Note     Name: Kem Rousseau  Clinic Number: [id number]    Therapy Diagnosis:   Encounter Diagnoses   Name Primary?    Acute right ankle pain Yes    Impaired functional mobility, balance, gait, and endurance      Physician: Rohith Arredondo MD    Visit Date: 8/11/2022  Physician Orders: PT Eval and Treat   Medical Diagnosis from Referral: Non displaced fracture of body of right talus  Evaluation Date: 7/21/2022  Authorization Period Expiration: 12/31/22  Plan of Care Expiration: 09/15/22  Progress Note Due: 08/21/22 or 10th visit  Visit # / Visits authorized: 5/20   FOTO: 1/3   PTA Visit #: 1/5    Time In: 345 PM  Time Out: 425 PM   Total Billable Time: 40 minutes     Precautions: Standard    Subjective     Pt reports: the neurosurgeon said her back is looking good with no nerve damage. The pain has been getting better and better.    She was compliant with home exercise program.  Response to previous treatment: No adverse response   Functional change: Intermittently walking with a shoe      Pain: 0/10, 3/10   Location: right ankle, low back    Objective     Kem received therapeutic exercises to develop strength and ROM for 32 minutes including:    Recumbent bike 5 min (Seat: 3, lvl 1)   Standing lunge stretch 2 min 5 sec hold   Gastroc stretch on wedge 4 x 30"   Soleus stretch on wedge 4 x 30"   4 way ankle 2 x 12 yellow theraband   Seated heel raises 4 x 15 8#      Kem received the following manual therapy techniques: Joint mobilizations and PROM were applied to the: right ankle for 00 minutes, including:    Ankle PROM in all planes  Grade II/II medial/lateral calcaneal rocking     Kem participated in neuromuscular re-education activities to improve: Balance, Kinesthetic, Sense and Proprioception for 8 minutes. The following activities were included:    BAPS CW/CCW 2 min ea   Tandem stance 4 x 30" ea     Kem received cold pack for 00 minutes to to decrease circulation, " pain, and swelling.    Home Exercises Provided and Patient Education Provided     Education provided:   -Transition to weightbearing   -HEP     Written Home Exercises Provided: Patient instructed to cont prior HEP.  Exercises were reviewed and Kem was able to demonstrate them prior to the end of the session. Kem demonstrated good  understanding of the education provided.     See EMR under Patient Instructions for exercises provided 7/25/2022.    Assessment     Patient was progressed to standing calf stretches with no pain or difficulties. Shuttle was not performed again secondary to increased discomfort it led to in her back.     Kem Is progressing well towards her goals.   Pt prognosis is Excellent.     Pt will continue to benefit from skilled outpatient physical therapy to address the deficits listed in the problem list box on initial evaluation, provide pt/family education and to maximize pt's level of independence in the home and community environment.     Pt's spiritual, cultural and educational needs considered and pt agreeable to plan of care and goals.    Anticipated barriers to physical therapy: Unable to drive     Goals:  Short Term Goals: 4 weeks   1) Pt will be I with established HEP - met 07/25/22  2) Edema will decrease by 50% - progressing, not met   3) Pt will have right ankle ROM within 25% of left to assist in normalizing gait mechanics - progressing, not met   4) Pt will walk community distances without wearing a walking boot - progressing ,not met      Long Term Goals: 8 weeks   1) Strength will be 5/5 in all muscle groups - progressing, not met   2) R ankle ROM will be equal to left - progressing, not met   3) Pt will walk community distances on all surfaces with normal gait mechanics - progressing, not met      Plan     Continue progress of exercises with increased closed chain activities     Ponce Johnson, PTA

## 2022-08-11 ENCOUNTER — CLINICAL SUPPORT (OUTPATIENT)
Dept: REHABILITATION | Facility: HOSPITAL | Age: 67
End: 2022-08-11
Payer: MEDICARE

## 2022-08-11 DIAGNOSIS — Z74.09 IMPAIRED FUNCTIONAL MOBILITY, BALANCE, GAIT, AND ENDURANCE: ICD-10-CM

## 2022-08-11 DIAGNOSIS — M25.571 ACUTE RIGHT ANKLE PAIN: Primary | ICD-10-CM

## 2022-08-11 PROCEDURE — 97112 NEUROMUSCULAR REEDUCATION: CPT | Mod: PN,CQ

## 2022-08-11 PROCEDURE — 97110 THERAPEUTIC EXERCISES: CPT | Mod: PN,CQ

## 2022-08-12 NOTE — PROGRESS NOTES
"  Physical Therapy Daily Treatment Note     Name: Kem Rousseau  Clinic Number: [id number]    Therapy Diagnosis:   Encounter Diagnoses   Name Primary?    Acute right ankle pain Yes    Impaired functional mobility, balance, gait, and endurance      Physician: Rohith Arredondo MD    Visit Date: 8/15/2022  Physician Orders: PT Eval and Treat   Medical Diagnosis from Referral: Non displaced fracture of body of right talus  Evaluation Date: 7/21/2022  Authorization Period Expiration: 12/31/22  Plan of Care Expiration: 09/15/22  Progress Note Due: 08/21/22 or 10th visit  Visit # / Visits authorized: 6/20   FOTO: 1/3   PTA Visit #: 2/5    Time In: 350 PM  Time Out: 430 PM   Total Billable Time: 40 minutes     Precautions: Standard    Subjective     Pt reports: she is now wearing a soft brace which hasnt felt super supportive    She was compliant with home exercise program.  Response to previous treatment: No adverse response   Functional change: Intermittently walking with a shoe      Pain: 2/10, 0/10   Location: right ankle, low back    Objective     Kem received therapeutic exercises to develop strength and ROM for 32 minutes including:    Recumbent bike 5 min (Seat: 3, lvl 1)   Standing lunge stretch 2 min 5 sec hold   Gastroc stretch on wedge 4 x 30"   Soleus stretch on wedge 4 x 30"   4 way ankle 2 x 12 yellow theraband   Prone quad stretch 30s x 4  Seated heel raises 4 x 15 8#      Kem received the following manual therapy techniques: Joint mobilizations and PROM were applied to the: right ankle for 00 minutes, including:    Ankle PROM in all planes  Grade II/II medial/lateral calcaneal rocking     Kem participated in neuromuscular re-education activities to improve: Balance, Kinesthetic, Sense and Proprioception for 8 minutes. The following activities were included:    BAPS CW/CCW 2 min ea   Tandem stance 4 x 30" ea     Kem received cold pack for 00 minutes to to decrease circulation, pain, and " swelling.    Home Exercises Provided and Patient Education Provided     Education provided:   -Transition to weightbearing   -HEP     Written Home Exercises Provided: Patient instructed to cont prior HEP.  Exercises were reviewed and Kem was able to demonstrate them prior to the end of the session. Kem demonstrated good  understanding of the education provided.     See EMR under Patient Instructions for exercises provided 7/25/2022.    Assessment     Patients soft brace was adjusted to be more supportive for her back. Prone quad stretch was given to address patients complaints of cramping in the front of her left thigh at night. Ankle 4 way was more difficult for patient to perform this date, likely due to increased stiffness she presented to therapy with this date.    Kem Is progressing well towards her goals.   Pt prognosis is Excellent.     Pt will continue to benefit from skilled outpatient physical therapy to address the deficits listed in the problem list box on initial evaluation, provide pt/family education and to maximize pt's level of independence in the home and community environment.     Pt's spiritual, cultural and educational needs considered and pt agreeable to plan of care and goals.    Anticipated barriers to physical therapy: Unable to drive     Goals:  Short Term Goals: 4 weeks   1) Pt will be I with established HEP - met 07/25/22  2) Edema will decrease by 50% - progressing, not met   3) Pt will have right ankle ROM within 25% of left to assist in normalizing gait mechanics - progressing, not met   4) Pt will walk community distances without wearing a walking boot - progressing ,not met      Long Term Goals: 8 weeks   1) Strength will be 5/5 in all muscle groups - progressing, not met   2) R ankle ROM will be equal to left - progressing, not met   3) Pt will walk community distances on all surfaces with normal gait mechanics - progressing, not met      Plan     Continue progress of exercises  with increased closed chain activities     Ponce Johnson, PTA

## 2022-08-15 ENCOUNTER — CLINICAL SUPPORT (OUTPATIENT)
Dept: REHABILITATION | Facility: HOSPITAL | Age: 67
End: 2022-08-15
Payer: MEDICARE

## 2022-08-15 DIAGNOSIS — Z74.09 IMPAIRED FUNCTIONAL MOBILITY, BALANCE, GAIT, AND ENDURANCE: ICD-10-CM

## 2022-08-15 DIAGNOSIS — M25.571 ACUTE RIGHT ANKLE PAIN: Primary | ICD-10-CM

## 2022-08-15 PROCEDURE — 97112 NEUROMUSCULAR REEDUCATION: CPT | Mod: PN,CQ

## 2022-08-15 PROCEDURE — 97110 THERAPEUTIC EXERCISES: CPT | Mod: PN,CQ

## 2022-08-18 ENCOUNTER — CLINICAL SUPPORT (OUTPATIENT)
Dept: REHABILITATION | Facility: HOSPITAL | Age: 67
End: 2022-08-18
Payer: MEDICARE

## 2022-08-18 DIAGNOSIS — M25.571 ACUTE RIGHT ANKLE PAIN: Primary | ICD-10-CM

## 2022-08-18 DIAGNOSIS — Z74.09 IMPAIRED FUNCTIONAL MOBILITY, BALANCE, GAIT, AND ENDURANCE: ICD-10-CM

## 2022-08-18 PROCEDURE — 97110 THERAPEUTIC EXERCISES: CPT | Mod: PN | Performed by: PHYSICAL THERAPIST

## 2022-08-18 NOTE — PROGRESS NOTES
"  Physical Therapy Daily Treatment Note     Name: Kem Rousseau  Clinic Number: [id number]    Therapy Diagnosis:   Encounter Diagnoses   Name Primary?    Acute right ankle pain Yes    Impaired functional mobility, balance, gait, and endurance      Physician: Rohith Arredondo MD    Visit Date: 8/18/2022  Physician Orders: PT Eval and Treat   Medical Diagnosis from Referral: Non displaced fracture of body of right talus  Evaluation Date: 7/21/2022  Authorization Period Expiration: 12/31/22  Plan of Care Expiration: 09/15/22  Progress Note Due: 08/21/22 or 10th visit  Visit # / Visits authorized: 6/20   FOTO: 1/3   PTA Visit #: 2/5    Time In: 2:15 PM  Time Out: 2:58 PM   Total Billable Time: 40 minutes     Precautions: Standard    Subjective     Pt reports: doing well and being able to walk for longer duration    She was compliant with home exercise program.  Response to previous treatment: No adverse response   Functional change: Intermittently walking with a shoe      Pain: 2/10, 0/10   Location: right ankle, low back    Objective     SFMA Screen  SLS: R: 5 L: 20 seconds      GIRTH:    Right Left   Bimalleolaor 9 1/2 in 9 in   Figure 8: 18 1/2 in 18 1/4 in   Met heads: 7 3/4 in 7 1/2  in         Strength and Range of Motion (degrees)    Right LE (A/) Left LE   Extensor digitorum 5/5   5/5     Inversion 5/5 5/5   Eversion 4/5   5/5     Dorsiflexion 5/5   5/5     Plantarflexion 5/5   5/5     Great toe extension 5/5      5/5          Ankle dorsiflexion 4 (10) 6   Ankle plantarflexion 55 65   Ankle eversion 14 18   Ankle inversion  30  35        Kem received therapeutic exercises to develop strength and ROM for 36 minutes including:    Recumbent bike 5 min (Seat: 3, lvl 1)   Standing heel raises 3 x 10   Step ups 2 x 10   Gastroc stretch on wedge 4 x 30"   Soleus stretch on wedge 4 x 30"   4 way ankle 3 x 10 red theraband   Seated heel raises 4 x 15 8#      Kem received the following manual therapy techniques: " "Joint mobilizations and PROM were applied to the: right ankle for 00 minutes, including:    Ankle PROM in all planes  Grade II/II medial/lateral calcaneal rocking     Kem participated in neuromuscular re-education activities to improve: Balance, Kinesthetic, Sense and Proprioception for 4 minutes. The following activities were included:    BAPS CW/CCW 2 min ea - NP   Tandem stance 4 x 30" ea     Kem received cold pack for 00 minutes to to decrease circulation, pain, and swelling.    Home Exercises Provided and Patient Education Provided     Education provided:   -Transition to weightbearing   -HEP     Written Home Exercises Provided: Patient instructed to cont prior HEP.  Exercises were reviewed and Kem was able to demonstrate them prior to the end of the session. Kem demonstrated good  understanding of the education provided.     See EMR under Patient Instructions for exercises provided 7/25/2022.    Assessment     Patients with reduced swelling, improved ROM and improved strength. Mild pain, but this is expected. She is appropriate for discharge and will benefit from continued HEP performance to maintain gains.     Kem Is progressing well towards her goals.   Pt prognosis is Excellent.     Pt will continue to benefit from skilled outpatient physical therapy to address the deficits listed in the problem list box on initial evaluation, provide pt/family education and to maximize pt's level of independence in the home and community environment.     Pt's spiritual, cultural and educational needs considered and pt agreeable to plan of care and goals.    Anticipated barriers to physical therapy: Unable to drive     Goals:  Short Term Goals: 4 weeks   1) Pt will be I with established HEP - met 07/25/22  2) Edema will decrease by 50% -  met 08/18  3) Pt will have right ankle ROM within 25% of left to assist in normalizing gait mechanics -  met   4) Pt will walk community distances without wearing a walking boot -  " met      Long Term Goals: 8 weeks   1) Strength will be 5/5 in all muscle groups - progressing, not met   2) R ankle ROM will be equal to left - progressing, not met   3) Pt will walk community distances on all surfaces with normal gait mechanics -met      Plan     Discharge today due to functional goals met    Eric Duncan, PT

## 2022-10-19 PROBLEM — F41.9 ANXIETY DISORDER: Status: RESOLVED | Noted: 2018-01-04 | Resolved: 2022-10-19

## 2022-10-25 PROBLEM — E55.9 VITAMIN D DEFICIENCY: Status: ACTIVE | Noted: 2022-10-25

## 2022-10-25 PROBLEM — R73.03 PREDIABETES: Status: ACTIVE | Noted: 2022-10-25

## 2022-10-25 PROBLEM — F32.A DEPRESSION: Status: ACTIVE | Noted: 2022-10-25

## 2022-10-25 PROBLEM — Z79.890 HORMONE REPLACEMENT THERAPY (HRT): Status: ACTIVE | Noted: 2022-10-25

## 2022-12-08 ENCOUNTER — LAB VISIT (OUTPATIENT)
Dept: LAB | Facility: HOSPITAL | Age: 67
End: 2022-12-08
Attending: NURSE PRACTITIONER
Payer: MEDICARE

## 2022-12-08 DIAGNOSIS — Z00.00 WELLNESS EXAMINATION: ICD-10-CM

## 2022-12-08 DIAGNOSIS — E78.00 PURE HYPERCHOLESTEROLEMIA: ICD-10-CM

## 2022-12-08 DIAGNOSIS — R73.03 PREDIABETES: ICD-10-CM

## 2022-12-08 DIAGNOSIS — E55.9 VITAMIN D DEFICIENCY: ICD-10-CM

## 2022-12-08 DIAGNOSIS — K21.9 GASTROESOPHAGEAL REFLUX DISEASE WITHOUT ESOPHAGITIS: ICD-10-CM

## 2022-12-08 DIAGNOSIS — E89.0 POSTPROCEDURAL HYPOTHYROIDISM: ICD-10-CM

## 2022-12-08 LAB
25(OH)D3+25(OH)D2 SERPL-MCNC: 25 NG/ML (ref 30–96)
ALBUMIN SERPL BCP-MCNC: 3.7 G/DL (ref 3.5–5.2)
ALP SERPL-CCNC: 62 U/L (ref 55–135)
ALT SERPL W/O P-5'-P-CCNC: 15 U/L (ref 10–44)
ANION GAP SERPL CALC-SCNC: 10 MMOL/L (ref 8–16)
AST SERPL-CCNC: 16 U/L (ref 10–40)
BASOPHILS # BLD AUTO: 0.03 K/UL (ref 0–0.2)
BASOPHILS NFR BLD: 0.8 % (ref 0–1.9)
BILIRUB SERPL-MCNC: 0.6 MG/DL (ref 0.1–1)
BUN SERPL-MCNC: 16 MG/DL (ref 8–23)
CALCIUM SERPL-MCNC: 9.4 MG/DL (ref 8.7–10.5)
CHLORIDE SERPL-SCNC: 106 MMOL/L (ref 95–110)
CHOLEST SERPL-MCNC: 181 MG/DL (ref 120–199)
CHOLEST/HDLC SERPL: 2.9 {RATIO} (ref 2–5)
CO2 SERPL-SCNC: 26 MMOL/L (ref 23–29)
CREAT SERPL-MCNC: 0.8 MG/DL (ref 0.5–1.4)
DIFFERENTIAL METHOD: ABNORMAL
EOSINOPHIL # BLD AUTO: 0.1 K/UL (ref 0–0.5)
EOSINOPHIL NFR BLD: 3.2 % (ref 0–8)
ERYTHROCYTE [DISTWIDTH] IN BLOOD BY AUTOMATED COUNT: 12.3 % (ref 11.5–14.5)
EST. GFR  (NO RACE VARIABLE): >60 ML/MIN/1.73 M^2
ESTIMATED AVG GLUCOSE: 114 MG/DL (ref 68–131)
GLUCOSE SERPL-MCNC: 102 MG/DL (ref 70–110)
HBA1C MFR BLD: 5.6 % (ref 4–5.6)
HCT VFR BLD AUTO: 43 % (ref 37–48.5)
HDLC SERPL-MCNC: 62 MG/DL (ref 40–75)
HDLC SERPL: 34.3 % (ref 20–50)
HGB BLD-MCNC: 14 G/DL (ref 12–16)
IMM GRANULOCYTES # BLD AUTO: 0 K/UL (ref 0–0.04)
IMM GRANULOCYTES NFR BLD AUTO: 0 % (ref 0–0.5)
LDLC SERPL CALC-MCNC: 101 MG/DL (ref 63–159)
LYMPHOCYTES # BLD AUTO: 1.8 K/UL (ref 1–4.8)
LYMPHOCYTES NFR BLD: 46.7 % (ref 18–48)
MCH RBC QN AUTO: 32.6 PG (ref 27–31)
MCHC RBC AUTO-ENTMCNC: 32.6 G/DL (ref 32–36)
MCV RBC AUTO: 100 FL (ref 82–98)
MONOCYTES # BLD AUTO: 0.3 K/UL (ref 0.3–1)
MONOCYTES NFR BLD: 7.2 % (ref 4–15)
NEUTROPHILS # BLD AUTO: 1.6 K/UL (ref 1.8–7.7)
NEUTROPHILS NFR BLD: 42.1 % (ref 38–73)
NONHDLC SERPL-MCNC: 119 MG/DL
NRBC BLD-RTO: 0 /100 WBC
PLATELET # BLD AUTO: 224 K/UL (ref 150–450)
PMV BLD AUTO: 9.4 FL (ref 9.2–12.9)
POTASSIUM SERPL-SCNC: 4 MMOL/L (ref 3.5–5.1)
PROT SERPL-MCNC: 6.4 G/DL (ref 6–8.4)
RBC # BLD AUTO: 4.3 M/UL (ref 4–5.4)
SODIUM SERPL-SCNC: 142 MMOL/L (ref 136–145)
TRIGL SERPL-MCNC: 90 MG/DL (ref 30–150)
TSH SERPL DL<=0.005 MIU/L-ACNC: 1.08 UIU/ML (ref 0.4–4)
WBC # BLD AUTO: 3.75 K/UL (ref 3.9–12.7)

## 2022-12-08 PROCEDURE — 83036 HEMOGLOBIN GLYCOSYLATED A1C: CPT | Performed by: NURSE PRACTITIONER

## 2022-12-08 PROCEDURE — 84443 ASSAY THYROID STIM HORMONE: CPT | Performed by: NURSE PRACTITIONER

## 2022-12-08 PROCEDURE — 80053 COMPREHEN METABOLIC PANEL: CPT | Performed by: NURSE PRACTITIONER

## 2022-12-08 PROCEDURE — 80061 LIPID PANEL: CPT | Performed by: NURSE PRACTITIONER

## 2022-12-08 PROCEDURE — 85025 COMPLETE CBC W/AUTO DIFF WBC: CPT | Performed by: NURSE PRACTITIONER

## 2022-12-08 PROCEDURE — 36415 COLL VENOUS BLD VENIPUNCTURE: CPT | Mod: PO | Performed by: NURSE PRACTITIONER

## 2022-12-08 PROCEDURE — 82306 VITAMIN D 25 HYDROXY: CPT | Performed by: NURSE PRACTITIONER

## 2022-12-08 RX ORDER — ERGOCALCIFEROL 1.25 MG/1
50000 CAPSULE ORAL
Qty: 12 CAPSULE | Refills: 4 | Status: SHIPPED | OUTPATIENT
Start: 2022-12-08 | End: 2023-12-29

## 2022-12-08 NOTE — PROGRESS NOTES
Thyroid level is stable at 1.08.  Metabolic panel is stable with a glucose of 102.  Other electrolytes and kidneys look normal.  Hemoglobin A1c has improved at 5.6%.  Lipid panel is stable with an LDL of 101.  Continue with lifestyle modification.    Vitamin-D level is low at 25.  Goal is greater than 30.  I will call in a weekly supplement.    CBC is stable with a slightly low white blood cell count.  We will continue to monitor.  No worries.

## 2023-02-17 ENCOUNTER — TELEPHONE (OUTPATIENT)
Dept: FAMILY MEDICINE | Facility: CLINIC | Age: 68
End: 2023-02-17
Payer: MEDICARE

## 2023-02-20 ENCOUNTER — HOSPITAL ENCOUNTER (OUTPATIENT)
Dept: RADIOLOGY | Facility: HOSPITAL | Age: 68
Discharge: HOME | End: 2023-02-20
Attending: NURSE PRACTITIONER
Payer: MEDICARE

## 2023-02-20 VITALS — BODY MASS INDEX: 24.46 KG/M2 | WEIGHT: 132.94 LBS | HEIGHT: 62 IN

## 2023-02-20 DIAGNOSIS — Z12.31 SCREENING MAMMOGRAM FOR BREAST CANCER: ICD-10-CM

## 2023-02-20 PROCEDURE — 77067 MAMMO DIGITAL SCREENING BILAT WITH TOMO: ICD-10-PCS | Mod: 26,,, | Performed by: RADIOLOGY

## 2023-02-20 PROCEDURE — 77063 MAMMO DIGITAL SCREENING BILAT WITH TOMO: ICD-10-PCS | Mod: 26,,, | Performed by: RADIOLOGY

## 2023-02-20 PROCEDURE — 77067 SCR MAMMO BI INCL CAD: CPT | Mod: 26,,, | Performed by: RADIOLOGY

## 2023-02-20 PROCEDURE — 77067 SCR MAMMO BI INCL CAD: CPT | Mod: TC,PO

## 2023-02-20 PROCEDURE — 77063 BREAST TOMOSYNTHESIS BI: CPT | Mod: 26,,, | Performed by: RADIOLOGY

## 2023-02-22 NOTE — DISCHARGE SUMMARY
"[facility]  Discharge Note  Short Stay    Procedure(s) (LRB):  EXCISION, MASS, EXTREMITY (Right)    OUTCOME: Patient tolerated treatment/procedure well without complication and is now ready for discharge.    DISPOSITION: Home or Self Care    FINAL DIAGNOSIS:  Foot mass, right    FOLLOWUP: In clinic in 1 week    DISCHARGE INSTRUCTIONS:    Discharge Procedure Orders   CRUTCHES FOR HOME USE     Order Specific Question Answer Comments   Type: Axillary    Height: 5' 1" (1.549 m)    Weight: 62.2 kg (137 lb 2 oz)    Length of need (1-99 months): 1      Diet general     Call MD for:  temperature >100.4     Call MD for:  persistent nausea and vomiting     Call MD for:  severe uncontrolled pain     Call MD for:  difficulty breathing, headache or visual disturbances     Call MD for:  redness, tenderness, or signs of infection (pain, swelling, redness, odor or green/yellow discharge around incision site)     Call MD for:  hives     Call MD for:  persistent dizziness or light-headedness     Call MD for:  extreme fatigue     Keep surgical extremity elevated     No driving, operating heavy equipment or signing legal documents while taking pain medication     Leave dressing on - Keep it clean, dry, and intact until clinic visit     Weight bearing restrictions (specify)   Order Comments: Weight bearing as tolerated on heel in darco shoe         "
There are no Wet Read(s) to document.

## 2023-03-08 ENCOUNTER — TELEPHONE (OUTPATIENT)
Dept: OBSTETRICS AND GYNECOLOGY | Facility: CLINIC | Age: 68
End: 2023-03-08
Payer: MEDICARE

## 2023-03-08 NOTE — TELEPHONE ENCOUNTER
LETTYM informing patient that Dr. Wiedemann will not be in clinic on March 22 and patient appointment will need to be rescheduled.    JOSE Merlos

## 2023-05-23 ENCOUNTER — PATIENT MESSAGE (OUTPATIENT)
Dept: RESEARCH | Facility: HOSPITAL | Age: 68
End: 2023-05-23
Payer: MEDICARE

## 2023-06-01 ENCOUNTER — TELEPHONE (OUTPATIENT)
Dept: ENDOSCOPY | Facility: HOSPITAL | Age: 68
End: 2023-06-01
Payer: MEDICARE

## 2023-06-01 ENCOUNTER — PATIENT MESSAGE (OUTPATIENT)
Dept: ENDOSCOPY | Facility: HOSPITAL | Age: 68
End: 2023-06-01
Payer: MEDICARE

## 2023-06-01 NOTE — TELEPHONE ENCOUNTER
"Contacted the patient to schedule their procedure(s). The patient did not answer the call and left a voice message asking them to contact Endoscopy Scheduling as soon as possible at [phone].    Patient is scheduled for EGD already at another facility    Jesús Daley MD  [facility]  Caller: Unspecified (1 month ago)  Procedure: EGD/Colonoscopy     Diagnosis: Diagnoses   Gastroesophageal reflux disease, unspecified whether esophagitis present [K21.9]   Epigastric pain [R10.13]   History of colon polyps     Procedure Timin-12 weeks     *If within 4 weeks selected, please husam as high priority*     *If greater than 12 weeks, please select "5-12 weeks" and delay sending until 2 months prior to requested date*     Provider: Myself     Location: [address]     Additional Scheduling Information: No scheduling concerns     Prep Specifications:Standard prep     Have you attached a patient to this message: yes   "

## 2023-06-08 ENCOUNTER — PATIENT MESSAGE (OUTPATIENT)
Dept: ENDOSCOPY | Facility: HOSPITAL | Age: 68
End: 2023-06-08
Payer: MEDICARE

## 2023-06-08 ENCOUNTER — TELEPHONE (OUTPATIENT)
Dept: ENDOSCOPY | Facility: HOSPITAL | Age: 68
End: 2023-06-08
Payer: MEDICARE

## 2023-08-25 ENCOUNTER — OFFICE VISIT (OUTPATIENT)
Dept: OTOLARYNGOLOGY | Facility: CLINIC | Age: 68
End: 2023-08-25
Payer: MEDICARE

## 2023-08-25 VITALS — BODY MASS INDEX: 25.28 KG/M2 | WEIGHT: 137.38 LBS | HEIGHT: 62 IN

## 2023-08-25 DIAGNOSIS — H61.23 BILATERAL IMPACTED CERUMEN: Primary | ICD-10-CM

## 2023-08-25 DIAGNOSIS — E05.00 GRAVES' EYE DISEASE: ICD-10-CM

## 2023-08-25 PROCEDURE — 99213 OFFICE O/P EST LOW 20 MIN: CPT | Mod: PBBFAC,PO | Performed by: STUDENT IN AN ORGANIZED HEALTH CARE EDUCATION/TRAINING PROGRAM

## 2023-08-25 PROCEDURE — 69210 PR REMOVAL IMPACTED CERUMEN REQUIRING INSTRUMENTATION, UNILATERAL: ICD-10-PCS | Mod: S$PBB,,, | Performed by: STUDENT IN AN ORGANIZED HEALTH CARE EDUCATION/TRAINING PROGRAM

## 2023-08-25 PROCEDURE — 99999 PR PBB SHADOW E&M-EST. PATIENT-LVL III: ICD-10-PCS | Mod: PBBFAC,,, | Performed by: STUDENT IN AN ORGANIZED HEALTH CARE EDUCATION/TRAINING PROGRAM

## 2023-08-25 PROCEDURE — 99203 OFFICE O/P NEW LOW 30 MIN: CPT | Mod: 25,S$PBB,, | Performed by: STUDENT IN AN ORGANIZED HEALTH CARE EDUCATION/TRAINING PROGRAM

## 2023-08-25 PROCEDURE — 99999 PR PBB SHADOW E&M-EST. PATIENT-LVL III: CPT | Mod: PBBFAC,,, | Performed by: STUDENT IN AN ORGANIZED HEALTH CARE EDUCATION/TRAINING PROGRAM

## 2023-08-25 PROCEDURE — 99203 PR OFFICE/OUTPT VISIT, NEW, LEVL III, 30-44 MIN: ICD-10-PCS | Mod: 25,S$PBB,, | Performed by: STUDENT IN AN ORGANIZED HEALTH CARE EDUCATION/TRAINING PROGRAM

## 2023-08-25 PROCEDURE — 69210 REMOVE IMPACTED EAR WAX UNI: CPT | Mod: S$PBB,,, | Performed by: STUDENT IN AN ORGANIZED HEALTH CARE EDUCATION/TRAINING PROGRAM

## 2023-08-25 PROCEDURE — 69210 REMOVE IMPACTED EAR WAX UNI: CPT | Mod: PBBFAC,PO | Performed by: STUDENT IN AN ORGANIZED HEALTH CARE EDUCATION/TRAINING PROGRAM
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
## 2023-12-29 DIAGNOSIS — E55.9 VITAMIN D DEFICIENCY: ICD-10-CM

## 2023-12-29 RX ORDER — ERGOCALCIFEROL 1.25 MG/1
50000 CAPSULE ORAL
Qty: 12 CAPSULE | Refills: 4 | Status: SHIPPED | OUTPATIENT
Start: 2023-12-29

## 2024-02-05 ENCOUNTER — PATIENT MESSAGE (OUTPATIENT)
Dept: OBSTETRICS AND GYNECOLOGY | Facility: CLINIC | Age: 69
End: 2024-02-05
Payer: MEDICARE

## 2024-02-22 ENCOUNTER — HOSPITAL ENCOUNTER (OUTPATIENT)
Dept: RADIOLOGY | Facility: HOSPITAL | Age: 69
Discharge: HOME | End: 2024-02-22
Attending: INTERNAL MEDICINE
Payer: MEDICARE

## 2024-02-22 DIAGNOSIS — Z12.31 ENCOUNTER FOR SCREENING MAMMOGRAM FOR BREAST CANCER: ICD-10-CM

## 2024-02-22 PROCEDURE — 77067 SCR MAMMO BI INCL CAD: CPT | Mod: TC,PO

## 2024-02-22 PROCEDURE — 77063 BREAST TOMOSYNTHESIS BI: CPT | Mod: 26,,, | Performed by: RADIOLOGY

## 2024-02-22 PROCEDURE — 77067 SCR MAMMO BI INCL CAD: CPT | Mod: 26,,, | Performed by: RADIOLOGY

## 2024-02-27 ENCOUNTER — OFFICE VISIT (OUTPATIENT)
Dept: OTOLARYNGOLOGY | Facility: CLINIC | Age: 69
End: 2024-02-27
Payer: MEDICARE

## 2024-02-27 ENCOUNTER — LAB VISIT (OUTPATIENT)
Dept: LAB | Facility: HOSPITAL | Age: 69
End: 2024-02-27
Attending: STUDENT IN AN ORGANIZED HEALTH CARE EDUCATION/TRAINING PROGRAM
Payer: MEDICARE

## 2024-02-27 VITALS — WEIGHT: 139.13 LBS | BODY MASS INDEX: 25.6 KG/M2 | HEIGHT: 62 IN

## 2024-02-27 DIAGNOSIS — E05.00 GRAVES' EYE DISEASE: ICD-10-CM

## 2024-02-27 DIAGNOSIS — R09.82 PND (POST-NASAL DRIP): ICD-10-CM

## 2024-02-27 DIAGNOSIS — R09.82 PND (POST-NASAL DRIP): Primary | ICD-10-CM

## 2024-02-27 DIAGNOSIS — Z91.018 FOOD ALLERGY: ICD-10-CM

## 2024-02-27 DIAGNOSIS — H61.23 BILATERAL IMPACTED CERUMEN: ICD-10-CM

## 2024-02-27 DIAGNOSIS — H61.23 IMPACTED CERUMEN, BILATERAL: ICD-10-CM

## 2024-02-27 PROCEDURE — 36415 COLL VENOUS BLD VENIPUNCTURE: CPT | Mod: PO | Performed by: STUDENT IN AN ORGANIZED HEALTH CARE EDUCATION/TRAINING PROGRAM

## 2024-02-27 PROCEDURE — 86003 ALLG SPEC IGE CRUDE XTRC EA: CPT | Performed by: STUDENT IN AN ORGANIZED HEALTH CARE EDUCATION/TRAINING PROGRAM

## 2024-02-27 PROCEDURE — 69210 REMOVE IMPACTED EAR WAX UNI: CPT | Mod: PBBFAC,PO | Performed by: STUDENT IN AN ORGANIZED HEALTH CARE EDUCATION/TRAINING PROGRAM

## 2024-02-27 PROCEDURE — 69210 REMOVE IMPACTED EAR WAX UNI: CPT | Mod: S$PBB,,, | Performed by: STUDENT IN AN ORGANIZED HEALTH CARE EDUCATION/TRAINING PROGRAM

## 2024-02-27 PROCEDURE — 99999 PR PBB SHADOW E&M-EST. PATIENT-LVL III: CPT | Mod: PBBFAC,,, | Performed by: STUDENT IN AN ORGANIZED HEALTH CARE EDUCATION/TRAINING PROGRAM

## 2024-02-27 PROCEDURE — 99213 OFFICE O/P EST LOW 20 MIN: CPT | Mod: PBBFAC,PO,25 | Performed by: STUDENT IN AN ORGANIZED HEALTH CARE EDUCATION/TRAINING PROGRAM

## 2024-02-27 PROCEDURE — 86003 ALLG SPEC IGE CRUDE XTRC EA: CPT | Mod: 59 | Performed by: STUDENT IN AN ORGANIZED HEALTH CARE EDUCATION/TRAINING PROGRAM

## 2024-02-27 PROCEDURE — 99214 OFFICE O/P EST MOD 30 MIN: CPT | Mod: 25,S$PBB,, | Performed by: STUDENT IN AN ORGANIZED HEALTH CARE EDUCATION/TRAINING PROGRAM

## 2024-02-27 RX ORDER — FLUTICASONE PROPIONATE 50 MCG
1 SPRAY, SUSPENSION (ML) NASAL 2 TIMES DAILY
Qty: 16 G | Refills: 11 | Status: SHIPPED | OUTPATIENT
Start: 2024-02-27 | End: 2025-02-26

## 2024-02-27 RX ORDER — AZELASTINE 1 MG/ML
1 SPRAY, METERED NASAL 2 TIMES DAILY
Qty: 30 ML | Refills: 11 | Status: SHIPPED | OUTPATIENT
Start: 2024-02-27 | End: 2025-02-26
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
## 2024-03-01 LAB
A ALTERNATA IGE QN: <0.1 KU/L
A FUMIGATUS IGE QN: <0.1 KU/L
BAHIA GRASS IGE QN: <0.1 KU/L
BERMUDA GRASS IGE QN: <0.1 KU/L
BLACK PEPPER IGE QN: <0.1 KU/L
C HERBARUM IGE QN: <0.1 KU/L
C LUNATA IGE QN: <0.1 KU/L
CAT DANDER IGE QN: <0.1 KU/L
CHILI PEPPER IGE QN: <0.1 KU/L
COCONUT IGE QN: <0.1 KU/L
COMMON RAGWEED IGE QN: <0.1 KU/L
D FARINAE IGE QN: <0.1 KU/L
D PTERONYSS IGE QN: <0.1 KU/L
DEPRECATED A ALTERNATA IGE RAST QL: NORMAL
DEPRECATED A FUMIGATUS IGE RAST QL: NORMAL
DEPRECATED BAHIA GRASS IGE RAST QL: NORMAL
DEPRECATED BERMUDA GRASS IGE RAST QL: NORMAL
DEPRECATED BLACK PEPPER IGE RAST QL: NORMAL
DEPRECATED C HERBARUM IGE RAST QL: NORMAL
DEPRECATED C LUNATA IGE RAST QL: NORMAL
DEPRECATED CAT DANDER IGE RAST QL: NORMAL
DEPRECATED CHILI PEPPER IGE RAST QL: NORMAL
DEPRECATED COCONUT IGE RAST QL: NORMAL
DEPRECATED COMMON RAGWEED IGE RAST QL: NORMAL
DEPRECATED D FARINAE IGE RAST QL: NORMAL
DEPRECATED D PTERONYSS IGE RAST QL: NORMAL
DEPRECATED DOG DANDER IGE RAST QL: NORMAL
DEPRECATED ELDER IGE RAST QL: NORMAL
DEPRECATED ENGL PLANTAIN IGE RAST QL: NORMAL
DEPRECATED HOUSE DUST GREER IGE RAST QL: NORMAL
DEPRECATED HOUSE DUST HS IGE RAST QL: NORMAL
DEPRECATED JOHNSON GRASS IGE RAST QL: NORMAL
DEPRECATED KIWIFRUIT IGE RAST QL: NORMAL
DEPRECATED ORANGE IGE RAST QL: NORMAL
DEPRECATED PECAN/HICK TREE IGE RAST QL: NORMAL
DEPRECATED ROACH IGE RAST QL: NORMAL
DEPRECATED STRAWBERRY IGE RAST QL: NORMAL
DEPRECATED TIMOTHY IGE RAST QL: NORMAL
DEPRECATED WHITE OAK IGE RAST QL: NORMAL
DOG DANDER IGE QN: <0.1 KU/L
ELDER IGE QN: <0.1 KU/L
ENGL PLANTAIN IGE QN: <0.1 KU/L
HOUSE DUST GREER IGE QN: <0.1 KU/L
HOUSE DUST HS IGE QN: <0.1 KU/L
JOHNSON GRASS IGE QN: <0.1 KU/L
KIWIFRUIT IGE QN: <0.1 KU/L
ORANGE IGE QN: <0.1 KU/L
PECAN/HICK TREE IGE QN: <0.1 KU/L
ROACH IGE QN: <0.1 KU/L
STRAWBERRY IGE QN: <0.1 KU/L
TIMOTHY IGE QN: <0.1 KU/L
WHITE OAK IGE QN: <0.1 KU/L

## 2024-12-09 ENCOUNTER — CLINICAL SUPPORT (OUTPATIENT)
Dept: REHABILITATION | Facility: HOSPITAL | Age: 69
End: 2024-12-09
Payer: MEDICARE

## 2024-12-09 DIAGNOSIS — M25.512 LEFT SHOULDER PAIN, UNSPECIFIED CHRONICITY: Primary | ICD-10-CM

## 2024-12-09 DIAGNOSIS — S46.012D TRAUMATIC INCOMPLETE TEAR OF LEFT ROTATOR CUFF, SUBSEQUENT ENCOUNTER: ICD-10-CM

## 2024-12-09 PROCEDURE — 97161 PT EVAL LOW COMPLEX 20 MIN: CPT | Mod: PN | Performed by: PHYSICAL THERAPIST

## 2024-12-09 PROCEDURE — 97110 THERAPEUTIC EXERCISES: CPT | Mod: PN | Performed by: PHYSICAL THERAPIST

## 2024-12-11 ENCOUNTER — CLINICAL SUPPORT (OUTPATIENT)
Dept: REHABILITATION | Facility: HOSPITAL | Age: 69
End: 2024-12-11
Payer: MEDICARE

## 2024-12-11 DIAGNOSIS — M25.512 LEFT SHOULDER PAIN, UNSPECIFIED CHRONICITY: Primary | ICD-10-CM

## 2024-12-11 PROCEDURE — 97110 THERAPEUTIC EXERCISES: CPT | Mod: PN | Performed by: PHYSICAL THERAPIST

## 2024-12-11 PROCEDURE — 97112 NEUROMUSCULAR REEDUCATION: CPT | Mod: PN | Performed by: PHYSICAL THERAPIST

## 2024-12-11 NOTE — PROGRESS NOTES
[facility]   Physical Therapy Treatment Note      Name: Kem Rousseau  Clinic Number: [id number]    Therapy Diagnosis: No diagnosis found.  Physician: Rohith Arredondo MD    Visit Date: 12/11/2024    Physician Orders: PT Eval and Treat   Medical Diagnosis from Referral: Traumatic incomplete tear of left rotator cuff   Evaluation Date: 12/9/2024  Authorization Period Expiration: 12/04/25  Plan of Care Expiration: 02/03/25  Progress Note Due: 01/09/25  Visit # / Visits authorized: 1/ 5  FOTO: 1/ 3      Precautions: Standard     Time In: 4:45 PM   Time Out: 1:50 PM   Total Appointment Time (timed & untimed codes): 47 minutes         Subjective     Patient reports: That this is the best her shoulder has felt in a while .  She was compliant with home exercise program.  Response to previous treatment: Initial evaluation   Functional change: ongoing     Pain: 3/10  Location: left shoulder      Objective      Objective Measures updated at progress report unless specified.     Treatment     Kem received the treatments listed below:      therapeutic exercises to develop strength and ROM for 33 minutes including:  UBE 3'/3'   Pullies flexion/scaption 2 min ea   Supine dowel ER @ 45* abduction 2 min   Supine dowel flexion 20x 2#  SL ER 3 x 10 1#   Scaption 3 x 10 1#   Lateral raises 3 x 10 1#   Resisted IR/ER 3 x 10 ea Green/red theraband     manual therapy techniques: Joint mobilizations were applied to the:  for  minutes, including:      neuromuscular re-education activities to improve: Kinesthetic Sense  and posture for 10 minutes. The following activities were included:  Prone shoulder extension 3 x 10 1#   Prone rows 3 x 10 2#   Rows 3 x 10 green theraband     therapeutic activities to improve functional performance for   minutes, including:      Patient Education and Home Exercises       Education provided:   - post treatment soreness     Written Home Exercises Provided: Pt instructed to  continue prior HEP. Exercises were reviewed and Kem was able to demonstrate them prior to the end of the session.  Kem demonstrated good  understanding of the education provided. See Electronic Medical Record under Patient Instructions for exercises provided during therapy sessions    Assessment     Good tolerance to today's session with some mild soreness post session. Performed all exercises with correct technique    Kem Is progressing well towards her goals.   Patient prognosis is Excellent.     Patient will continue to benefit from skilled outpatient physical therapy to address the deficits listed in the problem list box on initial evaluation, provide pt/family education and to maximize pt's level of independence in the home and community environment.     Patient's spiritual, cultural and educational needs considered and pt agreeable to plan of care and goals.     Anticipated barriers to physical therapy:     Goals:   Short Term Goals: 4 weeks   1) Pt will be I with established HEP   2) Pt will have left shoulder ROM within 10% of right shoulder to promote improved function  3) Strength will improve by 1/2 grade to improve ease of overhead ADL's  4) Pt will have pain no worse than 6/10 to improve ease of ADL activities      Long Term Goals: 8 weeks   1) Left shoulder ROM will be equal to right   2) Strength will improve by 1 grade to improve ease of ADL activities   3) Pt will lift 10# overhead with pain no worse than 3/10  4) FOTO score will improve by 20% to demonstrate functional improvement       Plan     Continue progression of RTC and periscapular strengthening    Eric Duncan, PT, DPT

## 2024-12-18 ENCOUNTER — CLINICAL SUPPORT (OUTPATIENT)
Dept: REHABILITATION | Facility: HOSPITAL | Age: 69
End: 2024-12-18
Attending: PHYSICAL THERAPIST
Payer: MEDICARE

## 2024-12-18 DIAGNOSIS — M25.512 LEFT SHOULDER PAIN, UNSPECIFIED CHRONICITY: Primary | ICD-10-CM

## 2024-12-18 PROCEDURE — 97110 THERAPEUTIC EXERCISES: CPT | Mod: PN | Performed by: PHYSICAL THERAPIST

## 2024-12-18 PROCEDURE — 97112 NEUROMUSCULAR REEDUCATION: CPT | Mod: PN | Performed by: PHYSICAL THERAPIST

## 2024-12-18 NOTE — PROGRESS NOTES
"[facility]   Physical Therapy Treatment Note      Name: Kem Rousseau  Clinic Number: [id number]    Therapy Diagnosis: No diagnosis found.  Physician: Rohith Arredondo MD    Visit Date: 12/18/2024    Physician Orders: PT Eval and Treat   Medical Diagnosis from Referral: Traumatic incomplete tear of left rotator cuff   Evaluation Date: 12/9/2024  Authorization Period Expiration: 12/04/25  Plan of Care Expiration: 02/03/25  Progress Note Due: 01/09/25  Visit # / Visits authorized: 2/ 5  FOTO: 1/ 3      Precautions: Standard     Time In: 3:10 PM   Time Out: 3:53 PM   Total Appointment Time (timed & untimed codes): 43 minutes         Subjective     Patient reports: That her shoulder is feeling better. I have been working it hard.   She was compliant with home exercise program.  Response to previous treatment: no adverse effects   Functional change: ongoing     Pain: 3/10  Location: left shoulder      Objective      Objective Measures updated at progress report unless specified.     Treatment     Kem received the treatments listed below:      therapeutic exercises to develop strength and ROM for 33 minutes including:  UBE 3'/3'   Pullies flexion/scaption 2 min ea   Supine dowel ER @ 45* abduction 2 min   Supine dowel flexion 20x 2#  SL ER 3 x 10 1#   Scaption 3 x 10 1#   Lateral raises 3 x 10 1#   Resisted IR/ER 3 x 10 ea Green/red theraband     manual therapy techniques: Joint mobilizations were applied to the:  for  minutes, including:      neuromuscular re-education activities to improve: Kinesthetic Sense  and posture for 10 minutes. The following activities were included:  Prone shoulder extension 3 x 10 1#   Prone T's 3 x 10 1#   Prone rows 3 x 10 2#   Supine serratus punches 3 x 10 2#  Rows 3 x 10 13#   Ball on wall 2 x 30" ea     therapeutic activities to improve functional performance for   minutes, including:      Patient Education and Home Exercises       Education provided: "   - post treatment soreness     Written Home Exercises Provided: Pt instructed to continue prior HEP. Exercises were reviewed and Kem was able to demonstrate them prior to the end of the session.  Kem demonstrated good  understanding of the education provided. See Electronic Medical Record under Patient Instructions for exercises provided during therapy sessions    Assessment     Good tolerance to today's session with some mild soreness post session. Performed all exercises with correct technique    Kem Is progressing well towards her goals.   Patient prognosis is Excellent.     Patient will continue to benefit from skilled outpatient physical therapy to address the deficits listed in the problem list box on initial evaluation, provide pt/family education and to maximize pt's level of independence in the home and community environment.     Patient's spiritual, cultural and educational needs considered and pt agreeable to plan of care and goals.     Anticipated barriers to physical therapy:     Goals:   Short Term Goals: 4 weeks   1) Pt will be I with established HEP   2) Pt will have left shoulder ROM within 10% of right shoulder to promote improved function  3) Strength will improve by 1/2 grade to improve ease of overhead ADL's  4) Pt will have pain no worse than 6/10 to improve ease of ADL activities      Long Term Goals: 8 weeks   1) Left shoulder ROM will be equal to right   2) Strength will improve by 1 grade to improve ease of ADL activities   3) Pt will lift 10# overhead with pain no worse than 3/10  4) FOTO score will improve by 20% to demonstrate functional improvement       Plan     Continue progression of RTC and periscapular strengthening    Eric Duncan, PT, DPT

## 2024-12-23 ENCOUNTER — CLINICAL SUPPORT (OUTPATIENT)
Dept: REHABILITATION | Facility: HOSPITAL | Age: 69
End: 2024-12-23
Payer: MEDICARE

## 2024-12-23 DIAGNOSIS — M25.512 LEFT SHOULDER PAIN, UNSPECIFIED CHRONICITY: Primary | ICD-10-CM

## 2024-12-23 PROCEDURE — 97112 NEUROMUSCULAR REEDUCATION: CPT | Mod: PN | Performed by: PHYSICAL THERAPIST

## 2024-12-23 PROCEDURE — 97110 THERAPEUTIC EXERCISES: CPT | Mod: PN | Performed by: PHYSICAL THERAPIST

## 2024-12-23 NOTE — PROGRESS NOTES
"[facility]   Physical Therapy Treatment Note      Name: Kem Rousseau  Clinic Number: [id number]    Therapy Diagnosis: No diagnosis found.  Physician: Rohith Arredondo MD    Visit Date: 12/23/2024    Physician Orders: PT Rosemaryal and Treat   Medical Diagnosis from Referral: Traumatic incomplete tear of left rotator cuff   Evaluation Date: 12/9/2024  Authorization Period Expiration: 12/04/25  Plan of Care Expiration: 02/03/25  Progress Note Due: 01/09/25  Visit # / Visits authorized: 3/ 5  FOTO: 1/ 3      Precautions: Standard     Time In: 4:00 PM   Time Out: 4:53 PM   Total Appointment Time (timed & untimed codes): 43 minutes         Subjective     Patient reports: That her shoulder is feeling better. Sore, but not painful.   She was compliant with home exercise program.  Response to previous treatment: no adverse effects   Functional change: ongoing     Pain: 2/10  Location: left shoulder      Objective      Objective Measures updated at progress report unless specified.     Treatment     Kem received the treatments listed below:      therapeutic exercises to develop strength and ROM for 30 minutes including:  UBE 3'/3'   Pullies flexion/scaption 2 min ea   Supine dowel ER @ 45* abduction 2 min   Supine dowel flexion 20x 2#  SL ER 3 x 10 1#   Scaption 3 x 10 1#   Lateral raises 3 x 10 1#   Resisted IR/ER 3 x 10 ea Green/red theraband     manual therapy techniques: Joint mobilizations were applied to the:  for  minutes, including:      neuromuscular re-education activities to improve: Kinesthetic Sense  and posture for 16 minutes. The following activities were included:  Prone shoulder extension 3 x 10 1#   Prone T's 3 x 10 1#   Prone rows 3 x 10 2#   Supine serratus punches 3 x 10 2#  Rows 3 x 10 13#   Ball on wall CW/CCW 2 x 30" ea     therapeutic activities to improve functional performance for   minutes, including:      Patient Education and Home Exercises       Education provided: "   - post treatment soreness     Written Home Exercises Provided: Pt instructed to continue prior HEP. Exercises were reviewed and Kem was able to demonstrate them prior to the end of the session.  Kem demonstrated good  understanding of the education provided. See Electronic Medical Record under Patient Instructions for exercises provided during therapy sessions    Assessment     Decreased shoulder pain with improved functional use. Some soreness continues, but this is not unexpected after adding new strengthening activities. She will benefit from additional progression as able to maximize functional gains.     Kem Is progressing well towards her goals.   Patient prognosis is Excellent.     Patient will continue to benefit from skilled outpatient physical therapy to address the deficits listed in the problem list box on initial evaluation, provide pt/family education and to maximize pt's level of independence in the home and community environment.     Patient's spiritual, cultural and educational needs considered and pt agreeable to plan of care and goals.     Anticipated barriers to physical therapy:     Goals:   Short Term Goals: 4 weeks   1) Pt will be I with established HEP   2) Pt will have left shoulder ROM within 10% of right shoulder to promote improved function  3) Strength will improve by 1/2 grade to improve ease of overhead ADL's  4) Pt will have pain no worse than 6/10 to improve ease of ADL activities      Long Term Goals: 8 weeks   1) Left shoulder ROM will be equal to right   2) Strength will improve by 1 grade to improve ease of ADL activities   3) Pt will lift 10# overhead with pain no worse than 3/10  4) FOTO score will improve by 20% to demonstrate functional improvement       Plan     Continue progression of RTC and periscapular strengthening    Eric Duncan, PT, DPT

## 2024-12-30 ENCOUNTER — CLINICAL SUPPORT (OUTPATIENT)
Dept: REHABILITATION | Facility: HOSPITAL | Age: 69
End: 2024-12-30
Payer: MEDICARE

## 2024-12-30 DIAGNOSIS — M25.512 LEFT SHOULDER PAIN, UNSPECIFIED CHRONICITY: Primary | ICD-10-CM

## 2024-12-30 PROCEDURE — 97112 NEUROMUSCULAR REEDUCATION: CPT | Mod: PN | Performed by: PHYSICAL THERAPIST

## 2024-12-30 PROCEDURE — 97110 THERAPEUTIC EXERCISES: CPT | Mod: PN | Performed by: PHYSICAL THERAPIST

## 2024-12-30 NOTE — PROGRESS NOTES
"[facility]   Physical Therapy Treatment Note      Name: Kem Rousseau  Clinic Number: [id number]    Therapy Diagnosis:   Encounter Diagnosis   Name Primary?    Left shoulder pain, unspecified chronicity Yes     Physician: Rohith Arredondo MD    Visit Date: 12/30/2024    Physician Orders: PT Eval and Treat   Medical Diagnosis from Referral: Traumatic incomplete tear of left rotator cuff   Evaluation Date: 12/9/2024  Authorization Period Expiration: 12/04/25  Plan of Care Expiration: 02/03/25  Progress Note Due: 01/09/25  Visit # / Visits authorized: 4/ 5  FOTO: 1/ 3      Precautions: Standard     Time In: 4:02 PM   Time Out: 4:49 PM   Total Appointment Time (timed & untimed codes): 47 minutes         Subjective     Patient reports: That her shoulder is doing much better   She was compliant with home exercise program.  Response to previous treatment: no adverse effects   Functional change: ongoing     Pain: 1/10  Location: left shoulder      Objective      FOTO: 35% limited, was 48% limited     Treatment     Kem received the treatments listed below:      therapeutic exercises to develop strength and ROM for 28 minutes including:  UBE 3'/3'   Pullies flexion/scaption 2 min ea   Supine dowel ER @ 45* abduction 2 min   Supine dowel flexion 20x 2#  SL ER 3 x 10 2#   Scaption 3 x 10 1#   Lateral raises 3 x 10 1#   Resisted IR/ER 3 x 10 ea Green&red/red  theraband     manual therapy techniques: Joint mobilizations were applied to the:  for  minutes, including:      neuromuscular re-education activities to improve: Kinesthetic Sense  and posture for 16 minutes. The following activities were included:  Prone shoulder extension 3 x 10 2#   Prone T's 3 x 10 1#   Prone rows 3 x 10 2#   Supine serratus punches 3 x 10 2#  Rows 3 x 10 13#   Ball on wall CW/CCW 2 x 30" ea     therapeutic activities to improve functional performance for  2 minutes, including:  Overhead dowel press 3 x 10 5# "     Patient Education and Home Exercises       Education provided:   - post treatment soreness     Written Home Exercises Provided: Pt instructed to continue prior HEP. Exercises were reviewed and Kem was able to demonstrate them prior to the end of the session.  Kem demonstrated good  understanding of the education provided. See Electronic Medical Record under Patient Instructions for exercises provided during therapy sessions    Assessment     Pain decreased and ROM and strength improved. Improved ability to lift overhead. Able to progress strengthening exercises today without increased pain. She will benefit from continued care with a focus on strengthening and improving the ability to reach behind her back.     Kem Is progressing well towards her goals.   Patient prognosis is Excellent.     Patient will continue to benefit from skilled outpatient physical therapy to address the deficits listed in the problem list box on initial evaluation, provide pt/family education and to maximize pt's level of independence in the home and community environment.     Patient's spiritual, cultural and educational needs considered and pt agreeable to plan of care and goals.     Anticipated barriers to physical therapy:     Goals:   Short Term Goals: 4 weeks   1) Pt will be I with established HEP   2) Pt will have left shoulder ROM within 10% of right shoulder to promote improved function  3) Strength will improve by 1/2 grade to improve ease of overhead ADL's  4) Pt will have pain no worse than 6/10 to improve ease of ADL activities      Long Term Goals: 8 weeks   1) Left shoulder ROM will be equal to right   2) Strength will improve by 1 grade to improve ease of ADL activities   3) Pt will lift 10# overhead with pain no worse than 3/10  4) FOTO score will improve by 20% to demonstrate functional improvement       Plan     Continue progression of RTC and periscapular strengthening    Eric Duncan, PT, DPT

## 2025-01-02 ENCOUNTER — CLINICAL SUPPORT (OUTPATIENT)
Dept: REHABILITATION | Facility: HOSPITAL | Age: 70
End: 2025-01-02
Payer: MEDICARE

## 2025-01-02 DIAGNOSIS — S46.012D TRAUMATIC INCOMPLETE TEAR OF LEFT ROTATOR CUFF, SUBSEQUENT ENCOUNTER: ICD-10-CM

## 2025-01-02 DIAGNOSIS — M25.512 LEFT SHOULDER PAIN, UNSPECIFIED CHRONICITY: Primary | ICD-10-CM

## 2025-01-02 PROCEDURE — 97110 THERAPEUTIC EXERCISES: CPT | Mod: PN

## 2025-01-02 PROCEDURE — 97112 NEUROMUSCULAR REEDUCATION: CPT | Mod: PN

## 2025-01-02 NOTE — PROGRESS NOTES
"[facility]   Physical Therapy Treatment Note      Name: Kem Rousseau  Clinic Number: [id number]    Therapy Diagnosis:   No diagnosis found.    Physician: Rohith Arredondo MD    Visit Date: 1/2/2025    Physician Orders: PT Mich and Treat   Medical Diagnosis from Referral: Traumatic incomplete tear of left rotator cuff   Evaluation Date: 12/9/2024  Authorization Period Expiration: 12/04/25  Plan of Care Expiration: 02/03/25  Progress Note Due: 01/09/25  Visit # / Visits authorized: 5/ 5  FOTO: 1/ 3      Precautions: Standard     Time In: 3:55PM   Time Out: 4:50 PM   Total Appointment Time (timed & untimed codes): 30 minutes         Subjective     Patient reports: she continues to have some difficulty with reaching behind her, but improved tolerance to reaching overhead.   She was compliant with home exercise program.  Response to previous treatment: no adverse effects   Functional change: ongoing     Pain: 1/10  Location: left shoulder      Objective      FOTO: 35% limited, was 48% limited     Treatment     Kem received the treatments listed below:      therapeutic exercises to develop strength and ROM for 28 minutes including:  UBE 3'/3'   Pullies flexion/scaption 2 min ea   Supine dowel ER @ 45* abduction 2 min   Supine dowel flexion 20x 2#  SL ER 3 x 10 2#   Scaption 3 x 10 1#   Lateral raises 3 x 10 1#   Resisted IR/ER 3 x 10 ea Green&red/red  theraband     manual therapy techniques: Joint mobilizations were applied to the:  for  minutes, including:      neuromuscular re-education activities to improve: Kinesthetic Sense  and posture for 16 minutes. The following activities were included:  Prone shoulder extension 3 x 10 2#   Prone T's 3 x 10 1#   Prone rows 3 x 10 2#   Supine serratus punches 3 x 10 2#  Rows 3 x 10 13#   Ball on wall CW/CCW 2 x 30" ea     therapeutic activities to improve functional performance for  2 minutes, including:  Overhead dowel press 3 x 10 5#     *A " portion of this treatment session is provided with the assistance of a skilled rehbailitation technician under the supervision of a licensed physical therapist        Patient Education and Home Exercises       Education provided:   - post treatment soreness     Written Home Exercises Provided: Pt instructed to continue prior HEP. Exercises were reviewed and Kem was able to demonstrate them prior to the end of the session.  Kem demonstrated good  understanding of the education provided. See Electronic Medical Record under Patient Instructions for exercises provided during therapy sessions    Assessment     Patient is reporting improved mobility and decreased pain since beginning therapy.  She continues to have some limitations with reaching behind her back and lifting overhead.  Patient will continue with current treatment plan to improve overall strength and stability of left shoulder.     Kem Is progressing well towards her goals.   Patient prognosis is Excellent.     Patient will continue to benefit from skilled outpatient physical therapy to address the deficits listed in the problem list box on initial evaluation, provide pt/family education and to maximize pt's level of independence in the home and community environment.     Patient's spiritual, cultural and educational needs considered and pt agreeable to plan of care and goals.     Anticipated barriers to physical therapy:     Goals:   Short Term Goals: 4 weeks   1) Pt will be I with established HEP   2) Pt will have left shoulder ROM within 10% of right shoulder to promote improved function  3) Strength will improve by 1/2 grade to improve ease of overhead ADL's  4) Pt will have pain no worse than 6/10 to improve ease of ADL activities      Long Term Goals: 8 weeks   1) Left shoulder ROM will be equal to right   2) Strength will improve by 1 grade to improve ease of ADL activities   3) Pt will lift 10# overhead with pain no worse than 3/10  4) FOTO score  will improve by 20% to demonstrate functional improvement       Plan     Continue progression of RTC and periscapular strengthening    Marcelino Krishna, PT

## 2025-02-25 ENCOUNTER — TELEPHONE (OUTPATIENT)
Dept: ENDOSCOPY | Facility: HOSPITAL | Age: 70
End: 2025-02-25
Payer: MEDICARE

## 2025-02-25 VITALS — BODY MASS INDEX: 25.76 KG/M2 | WEIGHT: 140 LBS | HEIGHT: 62 IN

## 2025-02-25 NOTE — TELEPHONE ENCOUNTER
----- Message -----   From: Mandy Crabtree   Sent: 2/25/2025   7:18 AM CST   To: [facility]   Subject: Colonoscopy                                      Patient requesting to speak with you regarding scheduling a colonoscopy.     Please call and advise.     Phone [phone]     Thank You

## 2025-02-25 NOTE — TELEPHONE ENCOUNTER
Patient stated she would like to be scheduled at [facility] it's more closer to her house. Will provide phone number on my chart message.

## 2025-02-26 ENCOUNTER — PROCEDURE VISIT (OUTPATIENT)
Dept: OTOLARYNGOLOGY | Facility: CLINIC | Age: 70
End: 2025-02-26
Payer: MEDICARE

## 2025-02-26 VITALS — HEIGHT: 62 IN | WEIGHT: 139.56 LBS | BODY MASS INDEX: 25.68 KG/M2

## 2025-02-26 DIAGNOSIS — H61.23 BILATERAL IMPACTED CERUMEN: Primary | ICD-10-CM

## 2025-02-26 PROCEDURE — 69210 REMOVE IMPACTED EAR WAX UNI: CPT | Mod: PBBFAC,PO | Performed by: NURSE PRACTITIONER

## 2025-02-26 PROCEDURE — 99499 UNLISTED E&M SERVICE: CPT | Mod: 25,S$PBB,, | Performed by: NURSE PRACTITIONER

## 2025-02-26 PROCEDURE — 69210 REMOVE IMPACTED EAR WAX UNI: CPT | Mod: S$PBB,,, | Performed by: NURSE PRACTITIONER

## 2025-02-26 NOTE — PROCEDURES
Ear Cerumen Removal    Date/Time: 2/26/2025 8:30 AM    Performed by: Nina Cantu NP  Authorized by: Nina Cantu NP    Consent Done?:  Not Needed    Local anesthetic:  None  Location details:  Both ears  Procedure type: curette    Cerumen  Removal Results:  Cerumen completely removed  Patient tolerance:  Patient tolerated the procedure well with no immediate complications

## 2025-04-24 ENCOUNTER — HOSPITAL ENCOUNTER (OUTPATIENT)
Dept: RADIOLOGY | Facility: HOSPITAL | Age: 70
Discharge: HOME | End: 2025-04-24
Attending: NURSE PRACTITIONER
Payer: MEDICARE

## 2025-04-24 ENCOUNTER — HOSPITAL ENCOUNTER (OUTPATIENT)
Dept: RADIOLOGY | Facility: HOSPITAL | Age: 70
Discharge: HOME | End: 2025-04-24
Attending: INTERNAL MEDICINE
Payer: MEDICARE

## 2025-04-24 DIAGNOSIS — Z12.31 ENCOUNTER FOR SCREENING MAMMOGRAM FOR BREAST CANCER: ICD-10-CM

## 2025-04-24 DIAGNOSIS — Z78.0 POSTMENOPAUSAL: ICD-10-CM

## 2025-04-24 PROCEDURE — 77080 DXA BONE DENSITY AXIAL: CPT | Mod: TC,PO

## 2025-04-24 PROCEDURE — 77067 SCR MAMMO BI INCL CAD: CPT | Mod: 26,,, | Performed by: RADIOLOGY

## 2025-04-24 PROCEDURE — 77063 BREAST TOMOSYNTHESIS BI: CPT | Mod: 26,,, | Performed by: RADIOLOGY

## 2025-04-24 PROCEDURE — 77080 DXA BONE DENSITY AXIAL: CPT | Mod: 26,,, | Performed by: RADIOLOGY

## 2025-04-24 PROCEDURE — 77063 BREAST TOMOSYNTHESIS BI: CPT | Mod: TC,PO

## (undated) DEVICE — ALCOHOL 70% ISOP RUBBING 4OZ

## (undated) DEVICE — DRAPE STERI-DRAPE 1000 17X11IN

## (undated) DEVICE — SEE MEDLINE ITEM 146298

## (undated) DEVICE — BLADE SURG #15 CARBON STEEL

## (undated) DEVICE — SEE MEDLINE ITEM 157131

## (undated) DEVICE — GAUZE SPONGE 4X4 12PLY

## (undated) DEVICE — GAUZE SPONGE 8X4 12 PLY

## (undated) DEVICE — SEE MEDLINE ITEM 157117

## (undated) DEVICE — Device

## (undated) DEVICE — GLOVE SURGICAL LATEX SZ 6.5

## (undated) DEVICE — DURAPREP SURG SCRUB 26ML

## (undated) DEVICE — ELECTRODE REM PLYHSV RETURN 9

## (undated) DEVICE — DRESSING XEROFORM FOIL PK 1X8

## (undated) DEVICE — PENCIL ROCKER SWITCH 10FT CORD

## (undated) DEVICE — SEE MEDLINE ITEM 152622